# Patient Record
Sex: MALE | Race: WHITE | NOT HISPANIC OR LATINO | Employment: OTHER | URBAN - METROPOLITAN AREA
[De-identification: names, ages, dates, MRNs, and addresses within clinical notes are randomized per-mention and may not be internally consistent; named-entity substitution may affect disease eponyms.]

---

## 2020-09-09 ENCOUNTER — OFFICE VISIT (OUTPATIENT)
Dept: NEUROLOGY | Facility: CLINIC | Age: 65
End: 2020-09-09
Payer: MEDICARE

## 2020-09-09 VITALS
BODY MASS INDEX: 22.19 KG/M2 | TEMPERATURE: 97.7 F | HEIGHT: 70 IN | WEIGHT: 155 LBS | DIASTOLIC BLOOD PRESSURE: 66 MMHG | RESPIRATION RATE: 16 BRPM | SYSTOLIC BLOOD PRESSURE: 130 MMHG | HEART RATE: 84 BPM

## 2020-09-09 DIAGNOSIS — K71.6 DRUG-INDUCED HEPATIC TOXICITY: ICD-10-CM

## 2020-09-09 DIAGNOSIS — T50.905A DRUG-INDUCED HEPATIC TOXICITY: ICD-10-CM

## 2020-09-09 DIAGNOSIS — G35 MULTIPLE SCLEROSIS (CMS/HCC): Primary | ICD-10-CM

## 2020-09-09 DIAGNOSIS — T50.905A ADVERSE EFFECT OF DRUG, INITIAL ENCOUNTER: ICD-10-CM

## 2020-09-09 PROBLEM — B18.1 CHRONIC VIRAL HEPATITIS B WITHOUT DELTA AGENT AND WITHOUT COMA (CMS/HCC): Status: ACTIVE | Noted: 2020-09-09

## 2020-09-09 PROCEDURE — 99205 OFFICE O/P NEW HI 60 MIN: CPT | Performed by: PSYCHIATRY & NEUROLOGY

## 2020-09-09 RX ORDER — CHOLECALCIFEROL (VITAMIN D3) 25 MCG
2000 TABLET ORAL DAILY
COMMUNITY

## 2020-09-09 RX ORDER — ATORVASTATIN CALCIUM 20 MG/1
20 TABLET, FILM COATED ORAL 3 TIMES WEEKLY
COMMUNITY
End: 2020-10-09

## 2020-09-09 RX ORDER — CLONAZEPAM 0.5 MG/1
0.5 TABLET ORAL DAILY
COMMUNITY
End: 2020-10-06 | Stop reason: SDUPTHER

## 2020-09-09 RX ORDER — METOPROLOL TARTRATE 25 MG/1
12.5 TABLET, FILM COATED ORAL 2 TIMES DAILY
COMMUNITY
Start: 2020-09-04 | End: 2024-09-12 | Stop reason: ALTCHOICE

## 2020-09-09 RX ORDER — ASPIRIN 81 MG/1
81 TABLET ORAL DAILY
COMMUNITY

## 2020-09-09 RX ORDER — ESCITALOPRAM OXALATE 10 MG/1
10 TABLET ORAL DAILY
COMMUNITY
End: 2020-09-09

## 2020-09-09 RX ORDER — DIMETHICONE 13 MG/ML
425 LOTION TOPICAL DAILY
COMMUNITY

## 2020-09-09 RX ORDER — BACLOFEN 10 MG/1
10 TABLET ORAL 2 TIMES DAILY
COMMUNITY
End: 2021-07-06

## 2020-09-09 RX ORDER — GLATIRAMER 40 MG/ML
40 INJECTION, SOLUTION SUBCUTANEOUS 3 TIMES WEEKLY
COMMUNITY
End: 2020-10-09

## 2020-09-09 RX ORDER — ROSUVASTATIN CALCIUM 10 MG/1
10 TABLET, COATED ORAL 3 TIMES WEEKLY
COMMUNITY

## 2020-09-09 SDOH — HEALTH STABILITY: MENTAL HEALTH: HOW MANY DRINKS CONTAINING ALCOHOL DO YOU HAVE ON A TYPICAL DAY WHEN YOU ARE DRINKING?: 1 OR 2

## 2020-09-09 SDOH — HEALTH STABILITY: MENTAL HEALTH: HOW OFTEN DO YOU HAVE A DRINK CONTAINING ALCOHOL?: 2-3 TIMES A WEEK

## 2020-09-09 NOTE — ASSESSMENT & PLAN NOTE
The patient has a history of a relapsing form of multiple sclerosis dating for 38 years.  He says he gets a relapse every 1 or 2 years.  Since he has been Medicare age he has been using leftover Copaxone usually 40 mg twice weekly.  He is interested in affordable alternatives.  He has specific interest in Ocrevus.  He will get baseline blood work and updated MRI scans of the brain and cervical spine.  I will see him in 6 weeks.

## 2020-09-09 NOTE — PROGRESS NOTES
Neurology Consult Note    Subjective     Virgil Edge is a 65 y.o. male evaluated regarding multiple sclerosis.      The patient says he initially presented with binocular visual impairment 38 years ago.  He improved with oral steroids.  He went on to have other relapses.  He followed with neurologists Dr. Aubrey Bennett at [facility] and subsequently Dr. Alberto Guallpa locally.  The patient seeks to establish a new neurology relationship.  He indicates that he took Copaxone for many years but once he had Medicare age he reduced his compliance to about twice weekly.  He indicates his last relapse was about a year ago.  He says he may get a relapse every 1 to 2 years.  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  He denies difficulty with vision.  He denies difficulty with hand function.  He drives with his right foot on the accelerator and left foot on the brake.  He has had a right AFO brace for about 15 years; he got a new one last year.  He has been ambulatory with a walker for about 10 years.  He has rare falls.  His wife is a nurse.  He had an MRI brain scan 9/7/2018 which reported white matter lesions consistent with multiple sclerosis but no enhancement.  MRI cervical spine then showed a left posterior lateral cord high signal at C3-4 and a questionable area lateral at C5.  He has been on Social Security since 2010.  He lives in a townhouse with his wife and has a stair chair x8 months.  He falls rarely.    Medical History:   Past Medical History:   Diagnosis Date   • BPH (benign prostatic hyperplasia)    • CAD (coronary artery disease)        Surgical History:   Past Surgical History:   Procedure Laterality Date   • CORONARY ANGIOPLASTY WITH STENT PLACEMENT  05/2016    x 3   • TRANSURETHRAL RESECTION OF PROSTATE  2013       Allergies: No known drug allergies    Current Outpatient Medications   Medication Sig Dispense Refill   • aspirin 81 mg enteric coated tablet Take 81 mg by mouth daily.      • atorvastatin (LIPITOR) 20 mg tablet Take 20 mg by mouth daily.     • baclofen (LIORESAL) 10 mg tablet Take 10 mg by mouth 2 (two) times a day.     • cholecalciferol, vitamin D3, 1,000 unit (25 mcg) tablet Take 2,000 Units by mouth daily.     • clonazePAM (klonoPIN) 0.5 mg tablet Take 0.5 mg by mouth daily.     • cranberry extract 425 mg capsule Take 425 mg by mouth daily.     • glatiramer (COPAXONE) 40 mg/mL syringe Inject 40 mg under the skin 3 (three) times a week (Mon, Wed, Fri).     • rosuvastatin (CRESTOR) 10 mg tablet Take 10 mg by mouth 3 (three) times a week (Mon, Wed, Fri).     • metoprolol tartrate (LOPRESSOR) 25 mg tablet Take 12.5 mg by mouth 2 (two) times a day.       No current facility-administered medications for this visit.          Family History: History reviewed. No pertinent family history.    Social History:   Social History     Socioeconomic History   • Marital status:      Spouse name: None   • Number of children: 2   • Years of education: None   • Highest education level: None   Occupational History   • Occupation: Retired   Social Needs   • Financial resource strain: None   • Food insecurity:     Worry: None     Inability: None   • Transportation needs:     Medical: None     Non-medical: None   Tobacco Use   • Smoking status: Never Smoker   • Smokeless tobacco: Never Used   Substance and Sexual Activity   • Alcohol use: Yes     Frequency: 2-3 times a week     Drinks per session: 1 or 2   • Drug use: None   • Sexual activity: None   Lifestyle   • Physical activity:     Days per week: None     Minutes per session: None   • Stress: None   Relationships   • Social connections:     Talks on phone: None     Gets together: None     Attends Jainism service: None     Active member of club or organization: None     Attends meetings of clubs or organizations: None     Relationship status: None   • Intimate partner violence:     Fear of current or ex partner: None     Emotionally abused:  "None     Physically abused: None     Forced sexual activity: None   Other Topics Concern   • None   Social History Narrative   • None       Review of Systems  Constitutional: negative  Eyes: positive for Pupillary trauma as a child  Ears, nose, mouth, throat, and face: negative  Respiratory: negative  Cardiovascular: positive for CAD  Gastrointestinal: negative  Genitourinary:positive for Urinary retention  Integument/breast: negative  Hematologic/lymphatic: negative  Musculoskeletal:positive for Weakness  Neurological: negative  Behavioral/Psych: negative  Endocrine: negative  Allergic/Immunologic: negative        Objective     Physical Exam  Visit Vitals  /66   Pulse 84   Temp 36.5 °C (97.7 °F)   Resp 16   Ht 1.778 m (5' 10\")   Wt 70.3 kg (155 lb)   BMI 22.24 kg/m²       General Appearance:  Alert, no distress, appears stated age   Head:  Normocephalic, atraumatic   Eyes:   Left pupil 4 mm with right pupil 2 mm,  EOM's intact                 Neck: Supple,  no carotid bruit         Lungs:  Clear to auscultation bilaterally, respirations unlabored       Heart Regular rate and rhythm, S1 and S2 normal, no murmur       Extremities:  no clubbing, cyanosis or edema    Musculoskeletal:  Right carbon fiber AFO.         Skin: Skin color, texture, turgor normal; no rashes or lesions   Behavior/Emotional: Appropriate, cooperative   Neurologic Exam:  Higher cortical function:  Alert and conversant.  Oriented x 3. Language intact.  Attention, concentration, memory mildly impaired.       Cranial nerve exam:  Pupils asymmetric as above. Extraocular movement full with normal pursuit and saccades. No nystagmus.  Facial movement and sensation is normal. Hearing intact.   Speech clear.     Motor exam: Mild bilateral hip flexor weakness, worse on the right.  Mild right dorsiflexor weakness.  Good  strength elsewhere.  Mild slowing of  rapid movements hands and more moderate feet, right greater than left.  Bilateral pronator " drift.There was decreased  bulk in the right lower leg.  Tone somewhat spastic in the legs.  No abnormal movements.     Reflexes : 1+ and symmetric with bilateral Babinski.     Sensory examination: Decreased  vibration toes.  Intact  graphaesthesia.    Cerebellar exam: Mild dysmetria.     Gait: rolling walker, right AFO brace, mildly spastic.        Assessment and Plan    Multiple sclerosis (CMS/[facility])  The patient has a history of a relapsing form of multiple sclerosis dating for 38 years.  He says he gets a relapse every 1 or 2 years.  Since he has been Medicare age he has been using leftover Copaxone usually 40 mg twice weekly.  He is interested in affordable alternatives.  He has specific interest in Ocrevus.  He will get baseline blood work and updated MRI scans of the brain and cervical spine.  I will see him in 6 weeks.    Adverse effect of drug/medicinal  Obtain surveillance blood work.      Arun Meadows MD  5:23 PM

## 2020-09-14 ENCOUNTER — TELEPHONE (OUTPATIENT)
Dept: NEUROLOGY | Facility: CLINIC | Age: 65
End: 2020-09-14

## 2020-09-14 DIAGNOSIS — T50.905D ADVERSE EFFECT OF DRUG, SUBSEQUENT ENCOUNTER: Primary | ICD-10-CM

## 2020-09-14 NOTE — TELEPHONE ENCOUNTER
I was able to order hepatitis studies with help.  Please send to patient.  I called and left message

## 2020-10-06 LAB
ALBUMIN SERPL-MCNC: 4.5 G/DL (ref 3.8–4.8)
ALBUMIN/GLOB SERPL: 2.4 {RATIO} (ref 1.2–2.2)
ALP SERPL-CCNC: 59 IU/L (ref 39–117)
ALT SERPL-CCNC: 32 IU/L (ref 0–44)
AST SERPL-CCNC: 24 IU/L (ref 0–40)
BASOPHILS # BLD AUTO: 0 X10E3/UL (ref 0–0.2)
BASOPHILS NFR BLD AUTO: 1 %
BILIRUB SERPL-MCNC: 0.7 MG/DL (ref 0–1.2)
BUN SERPL-MCNC: 13 MG/DL (ref 8–27)
BUN/CREAT SERPL: 15 (ref 10–24)
CALCIUM SERPL-MCNC: 9.2 MG/DL (ref 8.6–10.2)
CHLORIDE SERPL-SCNC: 105 MMOL/L (ref 96–106)
CO2 SERPL-SCNC: 29 MMOL/L (ref 20–29)
CREAT SERPL-MCNC: 0.87 MG/DL (ref 0.76–1.27)
EOSINOPHIL # BLD AUTO: 0.1 X10E3/UL (ref 0–0.4)
EOSINOPHIL NFR BLD AUTO: 1 %
ERYTHROCYTE [DISTWIDTH] IN BLOOD BY AUTOMATED COUNT: 12.1 % (ref 11.6–15.4)
GLOBULIN SER CALC-MCNC: 1.9 G/DL (ref 1.5–4.5)
GLUCOSE SERPL-MCNC: 103 MG/DL (ref 65–99)
HCT VFR BLD AUTO: 46.1 % (ref 37.5–51)
HGB BLD-MCNC: 15.4 G/DL (ref 13–17.7)
IMM GRANULOCYTES # BLD AUTO: 0 X10E3/UL (ref 0–0.1)
IMM GRANULOCYTES NFR BLD AUTO: 0 %
LAB CORP EGFR IF AFRICN AM: 103 ML/MIN/1.73
LAB CORP EGFR IF NONAFRICN AM: 89 ML/MIN/1.73
LYMPHOCYTES # BLD AUTO: 1.7 X10E3/UL (ref 0.7–3.1)
LYMPHOCYTES NFR BLD AUTO: 24 %
MCH RBC QN AUTO: 30.9 PG (ref 26.6–33)
MCHC RBC AUTO-ENTMCNC: 33.4 G/DL (ref 31.5–35.7)
MCV RBC AUTO: 92 FL (ref 79–97)
MONOCYTES # BLD AUTO: 0.5 X10E3/UL (ref 0.1–0.9)
MONOCYTES NFR BLD AUTO: 7 %
NEUTROPHILS # BLD AUTO: 4.8 X10E3/UL (ref 1.4–7)
NEUTROPHILS NFR BLD AUTO: 67 %
PLATELET # BLD AUTO: 176 X10E3/UL (ref 150–450)
POTASSIUM SERPL-SCNC: 4 MMOL/L (ref 3.5–5.2)
PROT SERPL-MCNC: 6.4 G/DL (ref 6–8.5)
RBC # BLD AUTO: 4.99 X10E6/UL (ref 4.14–5.8)
SODIUM SERPL-SCNC: 145 MMOL/L (ref 134–144)
SPECIMEN STATUS: NORMAL
WBC # BLD AUTO: 7.2 X10E3/UL (ref 3.4–10.8)

## 2020-10-06 RX ORDER — CLONAZEPAM 0.5 MG/1
0.5 TABLET ORAL DAILY
Qty: 30 TABLET | Refills: 5 | Status: SHIPPED | OUTPATIENT
Start: 2020-10-06 | End: 2021-04-22 | Stop reason: SDUPTHER

## 2020-10-13 ENCOUNTER — HOSPITAL ENCOUNTER (OUTPATIENT)
Dept: RADIOLOGY | Age: 65
Discharge: HOME | End: 2020-10-13
Attending: PSYCHIATRY & NEUROLOGY
Payer: MEDICARE

## 2020-10-13 DIAGNOSIS — G35 MULTIPLE SCLEROSIS (CMS/HCC): ICD-10-CM

## 2020-10-13 RX ORDER — GADOBUTROL 604.72 MG/ML
7 INJECTION INTRAVENOUS ONCE
Status: COMPLETED | OUTPATIENT
Start: 2020-10-13 | End: 2020-10-13

## 2020-10-13 RX ADMIN — GADOBUTROL 7 MMOL: 604.72 INJECTION INTRAVENOUS at 12:00

## 2020-10-13 NOTE — PROGRESS NOTES
Neurology Progress Note    Subjective     Virgil Edge is a 65 y.o. male evaluated regarding  multiple sclerosis.       I met the patient for initial evaluation 9/9/2020.  He initially presented with binocular visual impairment 38 years ago.  He improved with oral steroids.  He went on to have other relapses.  He followed with neurologists Dr. Aubrey Bennett at [facility] and subsequently Dr. Alberto Guallpa locally.   He took Copaxone for many years but once he had Medicare he reduced his compliance to about twice weekly.  He indicated his last relapse was about a year ago.  He said he may get a relapse every 1 to 2 years.  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  He denied difficulty with vision or hand function.  He drove with his right foot on the accelerator and left foot on the brake.  He  had a right AFO brace for about 15 years; he got a new one last year.  He had been ambulatory with a walker for about 10 years.  He had rare falls.  His wife is a nurse.  He had an MRI brain scan 9/7/2018 which reported white matter lesions consistent with multiple sclerosis but no enhancement.  MRI cervical spine then showed a left posterior lateral cord high signal at C3-4 and a questionable area lateral at C5.  He had been on Social Security since 2010.  He lived in a townhouse with his wife and had a stair chair x8 months.      The patient underwent MRI brain 10/13/2020 which reported scattered foci of T2 prolongation in the periventricular and deep white matter also involving the corpus callosum.  There were no enhancing abnormalities.  MRI cervical spine showed foci of abnormal signal throughout the cord most notably at C2-3 on the left, C3-4 on the left, C5-6 bilaterally, C7 on the left; there was no pathologic enhancement.  I reviewed images today with the patient and his wife Ashly (who is a nurse).  Laboratory studies 10/5/2020 showed benign chemistries and CBC.  Hepatitis B studies were not  obtained yet.  The wife feels that his ambulation has declined in the last 1 year.  He had physical therapy for 2 months this summer through [facility].    Medical History:   Past Medical History:   Diagnosis Date   • BPH (benign prostatic hyperplasia)    • CAD (coronary artery disease)        Surgical History:   Past Surgical History:   Procedure Laterality Date   • CORONARY ANGIOPLASTY WITH STENT PLACEMENT  05/2016    x 3   • TRANSURETHRAL RESECTION OF PROSTATE  2013       Allergies: No known drug allergies    Current Outpatient Medications   Medication Sig Dispense Refill   • aspirin 81 mg enteric coated tablet Take 81 mg by mouth daily.     • baclofen (LIORESAL) 10 mg tablet Take 10 mg by mouth 2 (two) times a day.     • cholecalciferol, vitamin D3, 1,000 unit (25 mcg) tablet Take 2,000 Units by mouth daily.     • clonazePAM (klonoPIN) 0.5 mg tablet Take 1 tablet (0.5 mg total) by mouth daily. 30 tablet 5   • cranberry extract 425 mg capsule Take 425 mg by mouth daily.     • metoprolol tartrate (LOPRESSOR) 25 mg tablet Take 12.5 mg by mouth 2 (two) times a day.     • rosuvastatin (CRESTOR) 10 mg tablet Take 10 mg by mouth 3 (three) times a week (Mon, Wed, Fri).       No current facility-administered medications for this visit.          Family History: History reviewed. No pertinent family history.    Social History:   Social History     Socioeconomic History   • Marital status:      Spouse name: None   • Number of children: 2   • Years of education: None   • Highest education level: None   Occupational History   • Occupation: Retired   Social Needs   • Financial resource strain: None   • Food insecurity     Worry: None     Inability: None   • Transportation needs     Medical: None     Non-medical: None   Tobacco Use   • Smoking status: Never Smoker   • Smokeless tobacco: Never Used   Substance and Sexual Activity   • Alcohol use: Yes     Frequency: 2-3 times a week     Drinks per session: 1 or 2   •  Drug use: None   • Sexual activity: None   Lifestyle   • Physical activity     Days per week: None     Minutes per session: None   • Stress: None   Relationships   • Social connections     Talks on phone: None     Gets together: None     Attends Yazidi service: None     Active member of club or organization: None     Attends meetings of clubs or organizations: None     Relationship status: None   • Intimate partner violence     Fear of current or ex partner: None     Emotionally abused: None     Physically abused: None     Forced sexual activity: None   Other Topics Concern   • None   Social History Narrative   • None           Objective     Physical Exam  Visit Vitals  BP (!) 134/56   Pulse 78   Temp 36.6 °C (97.9 °F)   Resp 16     Higher cortical function: Cognitively clear, language intact.  Cranial nerves: Eye signs as below otherwise.cranial nerves II through XII normal.  Motor: Normal strength and rapid movements throughout.  No pronator drift.  Deep tendon reflexes: 1+ and symmetric with absent right ankle, depressed the left ankle, and bilateral Babinski.  Sensory exam: Decreased vibration toes.    Cerebellum: Mild bilateral upper extremity dysmetria.  Gait: Mildly spastic and scraped his right toe, rolling walker, right AFO brace.    General: Awake, alert, in no apparent distress.  HEENT: Normocephalic atraumatic.  Eyes: Orbits benign,  extraocular motions full.  Left pupil larger than right.  Bilateral VINCENT.  Neck : Supple, no carotid bruit.    Lungs: Clear bilaterally.  Heart: S1 and S2 normal, regular rate and rhythm, no murmur.  Extremities: No clubbing, cyanosis, or edema.  Musculoskeletal: Right carbon fiber AFO brace.  .  Psychiatric: Appropriate and cooperative    Assessment and Plan    Multiple sclerosis (CMS/[facility])  The patient has a history of multiple sclerosis dating for 38 years.  He has had previous relapses.  He is using a leftover supply of Copaxone now that he is on Medicare and rather  than using this 40 mg 3 times weekly he is using it just twice weekly.  His updated MRI scans of the brain and cervical spinal cord show old but not new or enhancing lesions.  We discussed alternative disease modifying therapies.  He would need to get hepatitis B studies in order to pursue Ocrevus.  We discussed oral therapies as well.  His wife is a nurse and will look into options under his insurance and they might consider altering their insurance plan for the coming year 2021.  I will see him in follow-up in 2 months.      Arun Meadows MD  12:20 PM

## 2020-10-14 ENCOUNTER — OFFICE VISIT (OUTPATIENT)
Dept: NEUROLOGY | Facility: CLINIC | Age: 65
End: 2020-10-14
Payer: MEDICARE

## 2020-10-14 VITALS
RESPIRATION RATE: 16 BRPM | HEART RATE: 78 BPM | DIASTOLIC BLOOD PRESSURE: 56 MMHG | SYSTOLIC BLOOD PRESSURE: 134 MMHG | TEMPERATURE: 97.9 F

## 2020-10-14 DIAGNOSIS — G35 MULTIPLE SCLEROSIS (CMS/HCC): Primary | ICD-10-CM

## 2020-10-14 PROCEDURE — 99214 OFFICE O/P EST MOD 30 MIN: CPT | Performed by: PSYCHIATRY & NEUROLOGY

## 2020-10-14 NOTE — ASSESSMENT & PLAN NOTE
The patient has a history of multiple sclerosis dating for 38 years.  He has had previous relapses.  He is using a leftover supply of Copaxone now that he is on Medicare and rather than using this 40 mg 3 times weekly he is using it just twice weekly.  His updated MRI scans of the brain and cervical spinal cord show old but not new or enhancing lesions.  We discussed alternative disease modifying therapies.  He would need to get hepatitis B studies in order to pursue Ocrevus.  We discussed oral therapies as well.  His wife is a nurse and will look into options under his insurance and they might consider altering their insurance plan for the coming year 2021.  I will see him in follow-up in 2 months.

## 2020-11-02 ENCOUNTER — TELEPHONE (OUTPATIENT)
Dept: NEUROLOGY | Facility: CLINIC | Age: 65
End: 2020-11-02

## 2020-11-02 DIAGNOSIS — B19.10 HEPATITIS B INFECTION WITHOUT DELTA AGENT WITHOUT HEPATIC COMA, UNSPECIFIED CHRONICITY: Primary | ICD-10-CM

## 2020-11-24 LAB
HBV CORE AB SERPL QL IA: NEGATIVE
HBV SURFACE AG SERPL QL IA: NEGATIVE

## 2020-12-14 ENCOUNTER — TELEPHONE (OUTPATIENT)
Dept: NEUROLOGY | Facility: CLINIC | Age: 65
End: 2020-12-14

## 2020-12-14 DIAGNOSIS — G35 MULTIPLE SCLEROSIS (CMS/HCC): Primary | ICD-10-CM

## 2020-12-14 RX ORDER — PREDNISONE 20 MG/1
TABLET ORAL
Qty: 45 TABLET | Refills: 0 | Status: SHIPPED | OUTPATIENT
Start: 2020-12-14 | End: 2021-07-06

## 2020-12-14 NOTE — TELEPHONE ENCOUNTER
Phoned and discussed with the wife who is a nurse.  She indicates that Bennie has had enhanced weakness in the lower extremities and some pins-and-needles in his lower back which began 12/12/2020.  He has had this a number of times in the past and Dr. Guallpa would typically have him take prednisone 60 mg daily x4 days, 50 mg daily x4 days, etc.  The wife said they had some leftover prednisone that began this on 12/12.  I sent prescription for prednisone 20 mg (tablets are splittable).  I am due to see him in follow-up in 3 days, weather permitting.  Rx sent

## 2020-12-17 ENCOUNTER — TELEMEDICINE (OUTPATIENT)
Dept: NEUROLOGY | Facility: CLINIC | Age: 65
End: 2020-12-17
Payer: MEDICARE

## 2020-12-17 VITALS
BODY MASS INDEX: 20.3 KG/M2 | DIASTOLIC BLOOD PRESSURE: 70 MMHG | HEIGHT: 71 IN | WEIGHT: 145 LBS | SYSTOLIC BLOOD PRESSURE: 130 MMHG

## 2020-12-17 DIAGNOSIS — G35 MULTIPLE SCLEROSIS (CMS/HCC): Primary | ICD-10-CM

## 2020-12-17 PROCEDURE — 99213 OFFICE O/P EST LOW 20 MIN: CPT | Mod: 95 | Performed by: PSYCHIATRY & NEUROLOGY

## 2020-12-17 NOTE — PROGRESS NOTES
Verification of Patient Location:  The patient affirms they are currently located in the following state: [place]    Request for Consent:    Video Encounter   Jenny, my name is Arun Meadows MD.  Before we proceed, can you please verify your identification by telling me your full name and date of birth?  Can you tell me who is in the room with you?    You and I are about to have a telemedicine check-in or visit because you have requested it.  This is a live video-conference.  I am a real person, speaking to you in real time.  There is no one else with me on the video-conference.  However, when we use ([employer], [employer], etc) it is important for you to know that the video-conference may not be secure or private.  I am not recording this conversation and I am asking you not to record it.  This telemedicine visit will be billed to your health insurance or you, if you are self-insured.  You understand you will be responsible for any copayments or coinsurances that apply to your telemedicine visit.  Communication platform used for this encounter:  [employer]net     Before starting our telemedicine visit, I am required to get your consent for this virtual check-in or visit by telemedicine. Do you consent?      Patient Response to Request for Consent:  Yes      Visit Documentation:  Subjective     Patient ID: Virgil Edge is a 65 y.o. male.  1955      [facility]    Neurology Progress Note    Subjective     Virgil Edge is a 65 y.o. male evaluated regarding  multiple sclerosis.       I met the patient for initial evaluation 9/9/2020.  He initially presented with binocular visual impairment 38 years ago.  He improved with oral steroids.  He went on to have other relapses.  He followed with neurologists Dr. Aubrey Bennett at [facility] and subsequently Dr. Alberto Guallpa locally.   He took Copaxone for many years but once he had Medicare he reduced his compliance to about twice weekly.  He indicated his last relapse was  about a year ago.  He said he may get a relapse every 1 to 2 years.  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  He denied difficulty with vision or hand function.  He drove with his right foot on the accelerator and left foot on the brake.  He  had a right AFO brace for about 15 years; he got a new one last year.  He had been ambulatory with a walker for about 10 years.  He had rare falls.  His wife is a nurse.  He had an MRI brain scan 9/7/2018 which reported white matter lesions consistent with multiple sclerosis but no enhancement.  MRI cervical spine then showed a left posterior lateral cord high signal at C3-4 and a questionable area lateral at C5.  He had been on Social Security since 2010.  He lived in a townhouse with his wife and had a stair chair x8 months.       The patient returned 10/14/2020. MRI brain 10/13/2020 reported scattered foci of T2 prolongation in the periventricular and deep white matter also involving the corpus callosum.  There were no enhancing abnormalities.  MRI cervical spine showed foci of abnormal signal throughout the cord most notably at C2-3 on the left, C3-4 on the left, C5-6 bilaterally, C7 on the left; there was no pathologic enhancement.  I reviewed images  with the patient and his wife Ashly (who is a nurse).  Laboratory studies 10/5/2020 showed benign chemistries and CBC.  Hepatitis B studies were not obtained yet.  The wife felt that his ambulation had declined in the last 1 year.  He had physical therapy for 2 months this summer through [facility]. alternative disease modifying therapies were discussed including oral therapies and Ocrevus.    The wife phoned 12/14/2020 indicating that the patient had enhanced weakness in the lower extremities with some pins-and-needles in the lower back beginning 2 days prior.  This had occurred in the past and the patient would go on to an oral prednisone taper; I sent in a prescription in this regard.    At this time  the patient says he bounced back pretty quickly after beginning prednisone 60 mg daily x4 days.  Says that 3 weeks ago he had a UTI that cleared with Cipro twice daily x10 days.  It was after the UTI cleared that he had difficulty with his legs.  He did have a repeat urine culture.  I reviewed his laboratories of 11/23/2020 showing negative hepatitis B studies.  He has 2 months left of his Copaxone and is interested in transitioning to [facility].    Medical History:   Past Medical History:   Diagnosis Date   • BPH (benign prostatic hyperplasia)    • CAD (coronary artery disease)        Surgical History:   Past Surgical History:   Procedure Laterality Date   • CORONARY ANGIOPLASTY WITH STENT PLACEMENT  05/2016    x 3   • TRANSURETHRAL RESECTION OF PROSTATE  2013       Allergies: No known drug allergies    Current Outpatient Medications   Medication Sig Dispense Refill   • aspirin 81 mg enteric coated tablet Take 81 mg by mouth daily.     • baclofen (LIORESAL) 10 mg tablet Take 10 mg by mouth 2 (two) times a day.     • cholecalciferol, vitamin D3, 1,000 unit (25 mcg) tablet Take 2,000 Units by mouth daily.     • clonazePAM (klonoPIN) 0.5 mg tablet Take 1 tablet (0.5 mg total) by mouth daily. 30 tablet 5   • cranberry extract 425 mg capsule Take 425 mg by mouth daily.     • metoprolol tartrate (LOPRESSOR) 25 mg tablet Take 12.5 mg by mouth 2 (two) times a day.     • predniSONE (DELTASONE) 20 mg tablet 1 tablet 3 times daily x4 days then as directed.  Take with food 45 tablet 0   • rosuvastatin (CRESTOR) 10 mg tablet Take 10 mg by mouth 3 (three) times a week (Mon, Wed, Fri).       No current facility-administered medications for this visit.          Family History: History reviewed. No pertinent family history.    Social History:   Social History     Socioeconomic History   • Marital status:      Spouse name: None   • Number of children: 2   • Years of education: None   • Highest education level: None  "  Occupational History   • Occupation: Retired   Social Needs   • Financial resource strain: None   • Food insecurity     Worry: None     Inability: None   • Transportation needs     Medical: None     Non-medical: None   Tobacco Use   • Smoking status: Never Smoker   • Smokeless tobacco: Never Used   Substance and Sexual Activity   • Alcohol use: Yes     Frequency: 2-3 times a week     Drinks per session: 1 or 2   • Drug use: None   • Sexual activity: None   Lifestyle   • Physical activity     Days per week: None     Minutes per session: None   • Stress: None   Relationships   • Social connections     Talks on phone: None     Gets together: None     Attends Yazidi service: None     Active member of club or organization: None     Attends meetings of clubs or organizations: None     Relationship status: None   • Intimate partner violence     Fear of current or ex partner: None     Emotionally abused: None     Physically abused: None     Forced sexual activity: None   Other Topics Concern   • None   Social History Narrative   • None         Objective     Physical Exam  Visit Vitals  /70   Ht 1.803 m (5' 11\")   Wt 65.8 kg (145 lb)   BMI 20.22 kg/m²     Higher cortical function: Cognitively clear, language intact.  Cranial nerves: History of bilateral VINCENT and larger left pupil.  Otherwise cranial nerves II through XII normal.  Motor: Grossly intact upper extremity functions.    Cerebellum: No upper extremity dysmetria.  General: Awake, alert, in no apparent distress.  HEENT: Normocephalic atraumatic.  Eyes: Orbits benign,  extraocular motions full.  Neck : Supple.    Lungs: Comfortable respiratory status.    Psychiatric: Appropriate and cooperative    Assessment and Plan    Multiple sclerosis (CMS/[facility])  The patient had a recent decline in lower extremity function and responded quickly to prednisone 60 mg daily x4 days.  He may taper as 50 mg daily x2 days, 40 mg daily x2 days, etc..  He would like to finish out " his supply of Copaxone for 2 months and then pursue Ocrevus.  Our office will file paperwork on his behalf in this regard.  I will reassess him in March.      Arun Meadows MD  4:53 PM    Assessment/Plan   Diagnoses and all orders for this visit:    Multiple sclerosis (CMS/[facility]) (Primary)  Assessment & Plan:  The patient had a recent decline in lower extremity function and responded quickly to prednisone 60 mg daily x4 days.  He may taper as 50 mg daily x2 days, 40 mg daily x2 days, etc..  He would like to finish out his supply of Copaxone for 2 months and then pursue Ocrevus.  Our office will file paperwork on his behalf in this regard.  I will reassess him in March.        Time Spent in Medical Discussion During This Encounter:     19 minutes

## 2020-12-17 NOTE — ASSESSMENT & PLAN NOTE
The patient had a recent decline in lower extremity function and responded quickly to prednisone 60 mg daily x4 days.  He may taper as 50 mg daily x2 days, 40 mg daily x2 days, etc..  He would like to finish out his supply of Copaxone for 2 months and then pursue Ocrevus.  Our office will file paperwork on his behalf in this regard.  I will reassess him in March.

## 2021-04-14 DIAGNOSIS — Z23 ENCOUNTER FOR IMMUNIZATION: ICD-10-CM

## 2021-04-22 DIAGNOSIS — G35 MULTIPLE SCLEROSIS (CMS/HCC): Primary | ICD-10-CM

## 2021-04-22 RX ORDER — CLONAZEPAM 0.5 MG/1
0.5 TABLET ORAL DAILY
Qty: 30 TABLET | Refills: 5 | Status: SHIPPED | OUTPATIENT
Start: 2021-04-22 | End: 2021-10-29

## 2021-04-22 NOTE — TELEPHONE ENCOUNTER
I saw him 12/17/2020 at which time he said he was going to finish 2 months of leftover Copaxone and then consider pursuing Ocrevus.  I wanted to see him after his infusion so please set up a follow-up appointment.

## 2021-07-05 NOTE — PROGRESS NOTES
Neurology Progress Note    Subjective     Virgil Edge is a 65 y.o. male evaluated regarding multiple sclerosis.       I met the patient for initial evaluation 9/9/2020.  He initially presented with binocular visual impairment 38 years ago.  He improved with oral steroids.  He went on to have other relapses.  He followed with neurologists Dr. Aubrey Bennett at [facility] and subsequently Dr. Alberto Guallpa locally.   He took Copaxone for many years but once he had Medicare he reduced his compliance to about twice weekly.  He indicated his last relapse was about a year ago.  He said he may get a relapse every 1 to 2 years.  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  He denied difficulty with vision or hand function.  He drove with his right foot on the accelerator and left foot on the brake.  He  had a right AFO brace for about 15 years; he got a new one last year.  He had been ambulatory with a walker for about 10 years.  He had rare falls.  His wife is a nurse.  He had an MRI brain scan 9/7/2018 which reported white matter lesions consistent with multiple sclerosis but no enhancement.  MRI cervical spine then showed a left posterior lateral cord high signal at C3-4 and a questionable area lateral at C5.  He had been on Social Security since 2010.  He lived in a townhouse with his wife and had a stair chair x8 months.       The patient returned 10/14/2020. MRI brain 10/13/2020 reported scattered foci of T2 prolongation in the periventricular and deep white matter also involving the corpus callosum.  There were no enhancing abnormalities.  MRI cervical spine showed foci of abnormal signal throughout the cord most notably at C2-3 on the left, C3-4 on the left, C5-6 bilaterally, C7 on the left; there was no pathologic enhancement.  I reviewed images  with the patient and his wife Ashly (who is a nurse).  Laboratory studies 10/5/2020 showed benign chemistries and CBC.  Hepatitis B studies were not  obtained yet.  The wife felt that his ambulation had declined in the last 1 year.  He had physical therapy for 2 months this summer through [facility]. Alternative disease modifying therapies were discussed including oral therapies and Ocrevus.     The wife phoned 12/14/2020 indicating that the patient had enhanced weakness in the lower extremities with some pins-and-needles in the lower back beginning 2 days prior.  This had occurred in the past and the patient would go on to an oral prednisone taper; I sent in a prescription in this regard.     He was seen via telemedicine 12/17/2020.  He said  he bounced back pretty quickly after beginning prednisone 60 mg daily x4 days.    He said that 3 weeks ago he had a UTI that cleared with Cipro twice daily x10 days.  It was after the UTI cleared that he had difficulty with his legs.  He did have a repeat urine culture.  I reviewed his laboratories of 11/23/2020 showing negative hepatitis B studies.  He had 2 months left of his Copaxone and was interested in transitioning to Ocrevus.    He had his first Ocrevus infusions 3/2/2021 and 3/17/2021.  He indicates he tolerated these well.  He did have his Materna COVID-19 vaccinations 2/8/2021 and 3/8/2021.  He drove to the office today in his [employer]; he uses both feet.  He puts his walker in the back seat.  He was given a wheelchair and escorted back to our office.  When he gets home there are 5 steps into the front door of their townhouse and he has a railing.  He uses a cane to get in the front door and in the house.  He admits to some difficulty with memory.    Medical History:   Past Medical History:   Diagnosis Date   • BPH (benign prostatic hyperplasia)    • CAD (coronary artery disease)        Surgical History:   Past Surgical History:   Procedure Laterality Date   • CORONARY ANGIOPLASTY WITH STENT PLACEMENT  05/2016    x 3   • TRANSURETHRAL RESECTION OF PROSTATE  2013       Allergies: No known drug  allergies    Current Outpatient Medications   Medication Sig Dispense Refill   • aspirin 81 mg enteric coated tablet Take 81 mg by mouth daily.     • cholecalciferol, vitamin D3, 1,000 unit (25 mcg) tablet Take 2,000 Units by mouth daily.     • clonazePAM (klonoPIN) 0.5 mg tablet Take 1 tablet (0.5 mg total) by mouth daily. 30 tablet 5   • cranberry extract 425 mg capsule Take 425 mg by mouth daily.     • metoprolol tartrate (LOPRESSOR) 25 mg tablet Take 12.5 mg by mouth 2 (two) times a day.     • ocrelizumab (OCREVUS) 30 mg/mL solution Infuse 300 mg day 1 & day 15 10 mL 1   • rosuvastatin (CRESTOR) 10 mg tablet Take 10 mg by mouth 3 (three) times a week (Mon, Wed, Fri).       No current facility-administered medications for this visit.         Family History: History reviewed. No pertinent family history.    Social History:   Social History     Socioeconomic History   • Marital status:      Spouse name: None   • Number of children: 2   • Years of education: None   • Highest education level: None   Occupational History   • Occupation: Retired   Tobacco Use   • Smoking status: Never Smoker   • Smokeless tobacco: Never Used   Substance and Sexual Activity   • Alcohol use: Yes     Alcohol/week: 1.0 standard drinks     Types: 1 Glasses of wine per week   • Drug use: None   • Sexual activity: None   Other Topics Concern   • None   Social History Narrative   • None     Social Determinants of Health     Financial Resource Strain:    • Difficulty of Paying Living Expenses:    Food Insecurity:    • Worried About Running Out of Food in the Last Year:    • Ran Out of Food in the Last Year:    Transportation Needs:    • Lack of Transportation (Medical):    • Lack of Transportation (Non-Medical):    Physical Activity:    • Days of Exercise per Week:    • Minutes of Exercise per Session:    Stress:    • Feeling of Stress :    Social Connections:    • Frequency of Communication with Friends and Family:    • Frequency of  "Social Gatherings with Friends and Family:    • Attends Catholic Services:    • Active Member of Clubs or Organizations:    • Attends Club or Organization Meetings:    • Marital Status:    Intimate Partner Violence:    • Fear of Current or Ex-Partner:    • Emotionally Abused:    • Physically Abused:    • Sexually Abused:          Objective     Physical Exam  Visit Vitals  /78   Pulse 67   Ht 1.803 m (5' 11\")   Wt 68 kg (150 lb)   BMI 20.92 kg/m²     Higher cortical function: Cognitively clear, language intact.  Cranial nerves: Mild bilateral VINCENT.  Otherwise cranial nerves II through XII normal.  Motor: 3/5 bilateral hip flexor weakness, greater on the right.  Mild R>L dorsiflexor weakness.  Mild slowing of rapid movements  hands, right greater than left.  More moderate slowing in the feet.  Deep tendon reflexes:  bilateral Babinski signs.   Sensory exam: Decreased  vibration ankles.    Cerebellum: No upper extremity dysmetria.  Gait: In a wheelchair.    General: Awake, alert, in no apparent distress.  HEENT: Normocephalic atraumatic.  Eyes: Orbits benign, fundi benign, extraocular motions full.  Neck : Supple, no carotid bruit.    Lungs: Clear bilaterally.  Heart: S1 and S2 normal, regular rate and rhythm, no murmur.  Extremities: Right AFO brace that allows him to plantarflex  Musculoskeletal: No injury or deformity.  Psychiatric: Appropriate and cooperative    Assessment and Plan    Multiple sclerosis (CMS/[facility])  The patient has been initiated on Ocrevus.  We will see how he does in the coming timeframe.  Back in December he had a decline in lower extremity function and responded quickly to prednisone.  He is due for his next Ocrevus infusion in September and I will see him in follow-up in November.    Total time spent 33 minutes.    Arun Meaodws MD  5:01 PM  "

## 2021-07-06 ENCOUNTER — OFFICE VISIT (OUTPATIENT)
Dept: NEUROLOGY | Facility: CLINIC | Age: 66
End: 2021-07-06
Payer: MEDICARE

## 2021-07-06 VITALS
WEIGHT: 150 LBS | BODY MASS INDEX: 21 KG/M2 | SYSTOLIC BLOOD PRESSURE: 140 MMHG | DIASTOLIC BLOOD PRESSURE: 78 MMHG | HEIGHT: 71 IN | HEART RATE: 67 BPM

## 2021-07-06 DIAGNOSIS — G35 MULTIPLE SCLEROSIS (CMS/HCC): Primary | ICD-10-CM

## 2021-07-06 PROBLEM — B19.10 HBV (HEPATITIS B VIRUS) INFECTION: Status: RESOLVED | Noted: 2020-11-02 | Resolved: 2021-07-06

## 2021-07-06 PROCEDURE — 99214 OFFICE O/P EST MOD 30 MIN: CPT | Performed by: PSYCHIATRY & NEUROLOGY

## 2021-07-06 NOTE — LETTER
July 7, 2021     Krunal Alcala DO  [address]  [facility] [address]    Patient: Virgil Edge  YOB: 1955  Date of Visit: 7/6/2021      Dear Dr. Alcala:    Thank you for referring Virgil Edge to me for evaluation. Below are my notes for this consultation.    If you have questions, please do not hesitate to call me. I look forward to following your patient along with you.         Sincerely,        Arun Meadows MD        CC: No Recipients  Arun Meadows MD  7/6/2021  5:01 PM  Signed  Neurology Progress Note    Subjective     Virgil Edge is a 65 y.o. male evaluated regarding multiple sclerosis.       I met the patient for initial evaluation 9/9/2020.  He initially presented with binocular visual impairment 38 years ago.  He improved with oral steroids.  He went on to have other relapses.  He followed with neurologists Dr. Aubrey Bennett at [facility] and subsequently Dr. Alberto Guallpa locally.   He took Copaxone for many years but once he had Medicare he reduced his compliance to about twice weekly.  He indicated his last relapse was about a year ago.  He said he may get a relapse every 1 to 2 years.  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  He denied difficulty with vision or hand function.  He drove with his right foot on the accelerator and left foot on the brake.  He  had a right AFO brace for about 15 years; he got a new one last year.  He had been ambulatory with a walker for about 10 years.  He had rare falls.  His wife is a nurse.  He had an MRI brain scan 9/7/2018 which reported white matter lesions consistent with multiple sclerosis but no enhancement.  MRI cervical spine then showed a left posterior lateral cord high signal at C3-4 and a questionable area lateral at C5.  He had been on Social Security since 2010.  He lived in a townhouse with his wife and had a stair chair x8 months.       The patient returned 10/14/2020. MRI brain 10/13/2020  reported scattered foci of T2 prolongation in the periventricular and deep white matter also involving the corpus callosum.  There were no enhancing abnormalities.  MRI cervical spine showed foci of abnormal signal throughout the cord most notably at C2-3 on the left, C3-4 on the left, C5-6 bilaterally, C7 on the left; there was no pathologic enhancement.  I reviewed images  with the patient and his wife Ashly (who is a nurse).  Laboratory studies 10/5/2020 showed benign chemistries and CBC.  Hepatitis B studies were not obtained yet.  The wife felt that his ambulation had declined in the last 1 year.  He had physical therapy for 2 months this summer through [facility]. Alternative disease modifying therapies were discussed including oral therapies and Ocrevus.     The wife phoned 12/14/2020 indicating that the patient had enhanced weakness in the lower extremities with some pins-and-needles in the lower back beginning 2 days prior.  This had occurred in the past and the patient would go on to an oral prednisone taper; I sent in a prescription in this regard.     He was seen via telemedicine 12/17/2020.  He said  he bounced back pretty quickly after beginning prednisone 60 mg daily x4 days.    He said that 3 weeks ago he had a UTI that cleared with Cipro twice daily x10 days.  It was after the UTI cleared that he had difficulty with his legs.  He did have a repeat urine culture.  I reviewed his laboratories of 11/23/2020 showing negative hepatitis B studies.  He had 2 months left of his Copaxone and was interested in transitioning to Ocrevus.    He had his first Ocrevus infusions 3/2/2021 and 3/17/2021.  He indicates he tolerated these well.  He did have his Materna COVID-19 vaccinations 2/8/2021 and 3/8/2021.  He drove to the office today in his Farru[employer] Rogue; he uses both feet.  He puts his walker in the back seat.  He was given a wheelchair and escorted back to our office.  When he gets home there are 5 steps  into the front door of their townhouse and he has a railing.  He uses a cane to get in the front door and in the house.  He admits to some difficulty with memory.    Medical History:   Past Medical History:   Diagnosis Date   • BPH (benign prostatic hyperplasia)    • CAD (coronary artery disease)        Surgical History:   Past Surgical History:   Procedure Laterality Date   • CORONARY ANGIOPLASTY WITH STENT PLACEMENT  05/2016    x 3   • TRANSURETHRAL RESECTION OF PROSTATE  2013       Allergies: No known drug allergies    Current Outpatient Medications   Medication Sig Dispense Refill   • aspirin 81 mg enteric coated tablet Take 81 mg by mouth daily.     • cholecalciferol, vitamin D3, 1,000 unit (25 mcg) tablet Take 2,000 Units by mouth daily.     • clonazePAM (klonoPIN) 0.5 mg tablet Take 1 tablet (0.5 mg total) by mouth daily. 30 tablet 5   • cranberry extract 425 mg capsule Take 425 mg by mouth daily.     • metoprolol tartrate (LOPRESSOR) 25 mg tablet Take 12.5 mg by mouth 2 (two) times a day.     • ocrelizumab (OCREVUS) 30 mg/mL solution Infuse 300 mg day 1 & day 15 10 mL 1   • rosuvastatin (CRESTOR) 10 mg tablet Take 10 mg by mouth 3 (three) times a week (Mon, Wed, Fri).       No current facility-administered medications for this visit.         Family History: History reviewed. No pertinent family history.    Social History:   Social History     Socioeconomic History   • Marital status:      Spouse name: None   • Number of children: 2   • Years of education: None   • Highest education level: None   Occupational History   • Occupation: Retired   Tobacco Use   • Smoking status: Never Smoker   • Smokeless tobacco: Never Used   Substance and Sexual Activity   • Alcohol use: Yes     Alcohol/week: 1.0 standard drinks     Types: 1 Glasses of wine per week   • Drug use: None   • Sexual activity: None   Other Topics Concern   • None   Social History Narrative   • None     Social Determinants of Health  "    Financial Resource Strain:    • Difficulty of Paying Living Expenses:    Food Insecurity:    • Worried About Running Out of Food in the Last Year:    • Ran Out of Food in the Last Year:    Transportation Needs:    • Lack of Transportation (Medical):    • Lack of Transportation (Non-Medical):    Physical Activity:    • Days of Exercise per Week:    • Minutes of Exercise per Session:    Stress:    • Feeling of Stress :    Social Connections:    • Frequency of Communication with Friends and Family:    • Frequency of Social Gatherings with Friends and Family:    • Attends Jew Services:    • Active Member of Clubs or Organizations:    • Attends Club or Organization Meetings:    • Marital Status:    Intimate Partner Violence:    • Fear of Current or Ex-Partner:    • Emotionally Abused:    • Physically Abused:    • Sexually Abused:          Objective     Physical Exam  Visit Vitals  /78   Pulse 67   Ht 1.803 m (5' 11\")   Wt 68 kg (150 lb)   BMI 20.92 kg/m²     Higher cortical function: Cognitively clear, language intact.  Cranial nerves: Mild bilateral VINCENT.  Otherwise cranial nerves II through XII normal.  Motor: 3/5 bilateral hip flexor weakness, greater on the right.  Mild R>L dorsiflexor weakness.  Mild slowing of rapid movements  hands, right greater than left.  More moderate slowing in the feet.  Deep tendon reflexes:  bilateral Babinski signs.   Sensory exam: Decreased  vibration ankles.    Cerebellum: No upper extremity dysmetria.  Gait: In a wheelchair.    General: Awake, alert, in no apparent distress.  HEENT: Normocephalic atraumatic.  Eyes: Orbits benign, fundi benign, extraocular motions full.  Neck : Supple, no carotid bruit.    Lungs: Clear bilaterally.  Heart: S1 and S2 normal, regular rate and rhythm, no murmur.  Extremities: Right AFO brace that allows him to plantarflex  Musculoskeletal: No injury or deformity.  Psychiatric: Appropriate and cooperative    Assessment and Plan    Multiple " sclerosis (CMS/HCC)  The patient has been initiated on Ocrevus.  We will see how he does in the coming timeframe.  Back in December he had a decline in lower extremity function and responded quickly to prednisone.  He is due for his next Ocrevus infusion in September and I will see him in follow-up in November.    Total time spent 33 minutes.    Arun Meadows MD  5:01 PM

## 2021-07-06 NOTE — ASSESSMENT & PLAN NOTE
The patient has been initiated on Ocrevus.  We will see how he does in the coming timeframe.  Back in December he had a decline in lower extremity function and responded quickly to prednisone.  He is due for his next Ocrevus infusion in September and I will see him in follow-up in November.

## 2021-07-21 RX ORDER — BACLOFEN 10 MG/1
10 TABLET ORAL 2 TIMES DAILY
Qty: 180 TABLET | Refills: 1 | Status: SHIPPED | OUTPATIENT
Start: 2021-07-21 | End: 2022-03-29 | Stop reason: SDUPTHER

## 2021-07-21 NOTE — TELEPHONE ENCOUNTER
Patient called stating he would like to go back onto the Baclofen because its cheaper. He was taking 10mg twice daily. Please send medication to [facility]. Please advice.

## 2021-09-07 ENCOUNTER — TELEPHONE (OUTPATIENT)
Dept: NEUROLOGY | Facility: CLINIC | Age: 66
End: 2021-09-07

## 2021-09-07 NOTE — TELEPHONE ENCOUNTER
Gena called she said that yesterday Virgil was treated at [facility] for a laceration to the face. She said that the hospital recommended that he receive a tetanus shot, however he just got a ocrevus infusion. She would like to speak with you about if he should wait to get the shot.

## 2021-09-07 NOTE — TELEPHONE ENCOUNTER
Phoned and discussed.  He fell at home and struck his face on a wooden stool with a laceration that got repaired at the [facility].  He was told to get a tetanus shot because of the laceration.  There was no metal involved.  I told the wife that with patients on Ocrevus, getting vaccines in the mid cycle has been suggested.  This would be early December for this patient.  Given the fact that COVID-19 booster vaccinations are likely to be suggested, I would certainly prioritize that over getting a tetanus vaccine as to vaccine should not be given in close proximity to 1 another.

## 2021-10-29 DIAGNOSIS — G35 MULTIPLE SCLEROSIS (CMS/HCC): ICD-10-CM

## 2021-10-29 RX ORDER — CLONAZEPAM 0.5 MG/1
0.5 TABLET ORAL DAILY
Qty: 30 TABLET | Refills: 0 | Status: SHIPPED | OUTPATIENT
Start: 2021-10-29 | End: 2021-11-17 | Stop reason: SDUPTHER

## 2021-11-17 ENCOUNTER — OFFICE VISIT (OUTPATIENT)
Dept: NEUROLOGY | Facility: CLINIC | Age: 66
End: 2021-11-17
Payer: MEDICARE

## 2021-11-17 VITALS
SYSTOLIC BLOOD PRESSURE: 126 MMHG | BODY MASS INDEX: 21 KG/M2 | HEART RATE: 68 BPM | WEIGHT: 150 LBS | HEIGHT: 71 IN | TEMPERATURE: 97.6 F | DIASTOLIC BLOOD PRESSURE: 76 MMHG

## 2021-11-17 DIAGNOSIS — G35 MULTIPLE SCLEROSIS (CMS/HCC): Primary | ICD-10-CM

## 2021-11-17 PROCEDURE — 99214 OFFICE O/P EST MOD 30 MIN: CPT | Performed by: PSYCHIATRY & NEUROLOGY

## 2021-11-17 RX ORDER — CLONAZEPAM 0.5 MG/1
0.5 TABLET ORAL DAILY
Qty: 90 TABLET | Refills: 1 | Status: SHIPPED | OUTPATIENT
Start: 2021-11-17 | End: 2021-11-30 | Stop reason: SDUPTHER

## 2021-11-17 NOTE — ASSESSMENT & PLAN NOTE
The patient will continue Ocrevus as disease modifying therapy.  He is baseline tenuous gait function.  He declined after his fall with facial trauma 9/2021.  He is pursuing physical therapies.  He has not been driving.  I expressed my concern regarding his driving given limited right distal lower extremity motor functions.  We might consider practicing in a parking lot first.  He will continue home therapies.  I will see him in follow-up in 6 months.  His next Ocrevus should be in March.

## 2021-11-17 NOTE — PROGRESS NOTES
Neurology Progress Note    Subjective     Virgil Edge is a 66 y.o. male evaluated regarding multiple sclerosis.       I met the patient for initial evaluation 9/9/2020.  He initially presented with binocular visual impairment 38 years ago.  He improved with oral steroids.  He went on to have other relapses.  He followed with neurologists Dr. Aubrey Bennett at [facility] and subsequently Dr. Alberto Guallpa locally.   He took Copaxone for many years but once he had Medicare he reduced his compliance to about twice weekly.  He indicated his last relapse was about a year ago.  He said he may get a relapse every 1 to 2 years.  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  He denied difficulty with vision or hand function.  He drove with his right foot on the accelerator and left foot on the brake.  He  had a right AFO brace for about 15 years; he got a new one last year.  He had been ambulatory with a walker for about 10 years.  He had rare falls.  His wife is a nurse.  He had an MRI brain scan 9/7/2018 which reported white matter lesions consistent with multiple sclerosis but no enhancement.  MRI cervical spine then showed a left posterior lateral cord high signal at C3-4 and a questionable area lateral at C5.  He had been on Social Security since 2010.  He lived in a townhouse with his wife and had a stair chair x8 months.     The patient returned 10/14/2020. MRI brain 10/13/2020 reported scattered foci of T2 prolongation in the periventricular and deep white matter also involving the corpus callosum.  There were no enhancing abnormalities.  MRI cervical spine showed foci of abnormal signal throughout the cord most notably at C2-3 on the left, C3-4 on the left, C5-6 bilaterally, C7 on the left; there was no pathologic enhancement.  I reviewed images  with the patient and his wife Ashly (who is a nurse).  Laboratory studies 10/5/2020 showed benign chemistries and CBC.  Hepatitis B studies were not  obtained yet.  The wife felt that his ambulation had declined in the last 1 year.  He had physical therapy for 2 months this summer through [facility]. Alternative disease modifying therapies were discussed including oral therapies and Ocrevus.     The wife phoned 12/14/2020 indicating that the patient had enhanced weakness in the lower extremities with some pins-and-needles in the lower back beginning 2 days prior.  This had occurred in the past and the patient would go on to an oral prednisone taper; I sent in a prescription in this regard.     He was seen via telemedicine 12/17/2020.  He said  he bounced back pretty quickly after beginning prednisone 60 mg daily x4 days.    He said that 3 weeks ago he had a UTI that cleared with Cipro twice daily x10 days.  It was after the UTI cleared that he had difficulty with his legs.  He did have a repeat urine culture.  I reviewed his laboratories of 11/23/2020 showing negative hepatitis B studies.  He had 2 months left of his Copaxone and was interested in transitioning to Ocrevus.     He had his first Ocrevus infusions 3/2/2021 and 3/17/2021.  When seen 7/6/2021 he indicated that he tolerated these well.  He did have his Modernna COVID-19 vaccinations 2/8/2021 and 3/8/2021.  He drove to the office in his [employer] Rogue and drove with both feet.  He put his walker in the back seat.  He was given a wheelchair and escorted back to our office.  At home there are 5 steps into the front door of their townhouse and he has a railing.  He used a cane to get in the front door and in the house.  He admitted to some difficulty with memory.    I spoke to him by phone 9/7/2021. He fell at home and struck his face on a wooden stool with a laceration that got repaired at the [facility]. We discussed tetanus and COVID-19 booster vaccinations with reference to timing of Ocrevus.  He returns today with his wife.  He says that he had a really large left black eye that went away in  about 1 week.  He had stitches in the left eyebrow and below the left eye.  His walking declined thereafter.  He is using a walker upstairs and downstairs but not a cane.  He has been getting home physical therapy twice weekly.  He has not driven since his fall.  He always wears his right carbon fiber AFO.     Medical History:       Medical History:   Past Medical History:   Diagnosis Date   • BPH (benign prostatic hyperplasia)    • CAD (coronary artery disease)        Surgical History:   Past Surgical History:   Procedure Laterality Date   • CORONARY ANGIOPLASTY WITH STENT PLACEMENT  05/2016    x 3   • TRANSURETHRAL RESECTION OF PROSTATE  2013       Allergies: Fluconazole    Current Outpatient Medications   Medication Sig Dispense Refill   • aspirin 81 mg enteric coated tablet Take 81 mg by mouth daily.     • baclofen (LIORESAL) 10 mg tablet Take 1 tablet (10 mg total) by mouth 2 (two) times a day. 180 tablet 1   • cholecalciferol, vitamin D3, 1,000 unit (25 mcg) tablet Take 2,000 Units by mouth daily.     • clonazePAM 0.5 mg tablet Take 1 tablet (0.5 mg total) by mouth daily. 90 tablet 1   • cranberry extract 425 mg capsule Take 425 mg by mouth daily.     • metoprolol tartrate (LOPRESSOR) 25 mg tablet Take 12.5 mg by mouth 2 (two) times a day.     • ocrelizumab (OCREVUS) 30 mg/mL solution Infuse 300 mg day 1 & day 15 10 mL 1   • rosuvastatin (CRESTOR) 10 mg tablet Take 10 mg by mouth 3 (three) times a week (Mon, Wed, Fri).       No current facility-administered medications for this visit.         Family History: History reviewed. No pertinent family history.    Social History:   Social History     Socioeconomic History   • Marital status:      Spouse name: None   • Number of children: 2   • Years of education: None   • Highest education level: None   Occupational History   • Occupation: Retired   Tobacco Use   • Smoking status: Never Smoker   • Smokeless tobacco: Never Used   Substance and Sexual Activity  "  • Alcohol use: Yes     Alcohol/week: 1.0 standard drink     Types: 1 Glasses of wine per week   • Drug use: None   • Sexual activity: None   Other Topics Concern   • None   Social History Narrative   • None     Social Determinants of Health     Financial Resource Strain:    • Difficulty of Paying Living Expenses: Not on file   Food Insecurity:    • Worried About Running Out of Food in the Last Year: Not on file   • Ran Out of Food in the Last Year: Not on file   Transportation Needs:    • Lack of Transportation (Medical): Not on file   • Lack of Transportation (Non-Medical): Not on file   Physical Activity:    • Days of Exercise per Week: Not on file   • Minutes of Exercise per Session: Not on file   Stress:    • Feeling of Stress : Not on file   Social Connections:    • Frequency of Communication with Friends and Family: Not on file   • Frequency of Social Gatherings with Friends and Family: Not on file   • Attends Hoahaoism Services: Not on file   • Active Member of Clubs or Organizations: Not on file   • Attends Club or Organization Meetings: Not on file   • Marital Status: Not on file   Intimate Partner Violence:    • Fear of Current or Ex-Partner: Not on file   • Emotionally Abused: Not on file   • Physically Abused: Not on file   • Sexually Abused: Not on file   Housing Stability:    • Unable to Pay for Housing in the Last Year: Not on file   • Number of Places Lived in the Last Year: Not on file   • Unstable Housing in the Last Year: Not on file           Objective     Physical Exam  Visit Vitals  /76   Pulse 68   Temp 36.4 °C (97.6 °F)   Ht 1.803 m (5' 11\")   Wt 68 kg (150 lb)   BMI 20.92 kg/m²     Higher cortical function: Cognitively clear, language intact.  Cranial nerves: Mild bilateral VINCENT.  Otherwise cranial nerves II through XII normal.  Motor: 3/5 bilateral hip flexor weakness, greater on the right.  Right dorsiflexor weakness.  Mild slowing of rapid movements  hands, right greater than left. "  More moderate slowing in the feet.  Relative right lower leg muscle atrophy.  Deep tendon reflexes:   Brisk knees with crossed adductors, 1+ ankles, bilateral Babinski signs.   Sensory exam: Decreased  vibration right ankle and left.    Cerebellum: No upper extremity dysmetria.  Gait: In a wheelchair.    General: Awake, alert, in no apparent distress.  HEENT: Normocephalic atraumatic.  Eyes: Orbits benign, extraocular motions full.  Neck : Supple, no carotid bruit.    Lungs: Clear bilaterally.  Heart: S1 and S2 normal, regular rate and rhythm, no murmur.  Extremities: Right AFO brace  Musculoskeletal: No injury or deformity.  Psychiatric: Appropriate and cooperative  Assessment and Plan    Multiple sclerosis (CMS/[facility])  The patient will continue Ocrevus as disease modifying therapy.  He is baseline tenuous gait function.  He declined after his fall with facial trauma 9/2021.  He is pursuing physical therapies.  He has not been driving.  I expressed my concern regarding his driving given limited right distal lower extremity motor functions.  We might consider practicing in a parking lot first.  He will continue home therapies.  I will see him in follow-up in 6 months.  His next Ocrevus should be in March.    Total time spent 32 minutes.  Arun Meadows MD  4:40 PM

## 2021-11-30 DIAGNOSIS — G35 MULTIPLE SCLEROSIS (CMS/HCC): ICD-10-CM

## 2021-11-30 RX ORDER — CLONAZEPAM 0.5 MG/1
0.5 TABLET ORAL DAILY
Qty: 90 TABLET | Refills: 1 | Status: SHIPPED | OUTPATIENT
Start: 2021-11-30 | End: 2022-06-06 | Stop reason: SDUPTHER

## 2022-02-23 ENCOUNTER — TELEPHONE (OUTPATIENT)
Dept: NEUROLOGY | Facility: CLINIC | Age: 67
End: 2022-02-23
Payer: MEDICARE

## 2022-02-23 DIAGNOSIS — G35 MULTIPLE SCLEROSIS (CMS/HCC): Primary | ICD-10-CM

## 2022-02-23 NOTE — TELEPHONE ENCOUNTER
[facility] sent a fax statong if you could write a script for Ocrevus 600mg/ iv every 6 months w / refill. Please print script.

## 2022-03-30 RX ORDER — BACLOFEN 10 MG/1
10 TABLET ORAL 2 TIMES DAILY
Qty: 180 TABLET | Refills: 3 | Status: SHIPPED | OUTPATIENT
Start: 2022-03-30 | End: 2022-04-14 | Stop reason: SDUPTHER

## 2022-04-14 RX ORDER — BACLOFEN 10 MG/1
10 TABLET ORAL 2 TIMES DAILY
Qty: 180 TABLET | Refills: 3 | Status: SHIPPED | OUTPATIENT
Start: 2022-04-14 | End: 2023-08-18 | Stop reason: SDUPTHER

## 2022-05-17 ENCOUNTER — OFFICE VISIT (OUTPATIENT)
Dept: NEUROLOGY | Facility: CLINIC | Age: 67
End: 2022-05-17
Payer: MEDICARE

## 2022-05-17 VITALS
BODY MASS INDEX: 20.86 KG/M2 | HEART RATE: 56 BPM | TEMPERATURE: 97.3 F | HEIGHT: 71 IN | OXYGEN SATURATION: 97 % | SYSTOLIC BLOOD PRESSURE: 120 MMHG | DIASTOLIC BLOOD PRESSURE: 84 MMHG | WEIGHT: 149 LBS

## 2022-05-17 DIAGNOSIS — G35 MULTIPLE SCLEROSIS (CMS/HCC): Primary | ICD-10-CM

## 2022-05-17 PROCEDURE — 99213 OFFICE O/P EST LOW 20 MIN: CPT | Performed by: PSYCHIATRY & NEUROLOGY

## 2022-05-17 NOTE — PROGRESS NOTES
Neurology Progress Note    Subjective     Virgil Edge is a 66 y.o. male evaluated regarding   multiple sclerosis.       I met the patient for initial evaluation 9/9/2020.  He initially presented with binocular visual impairment 38 years ago.  He improved with oral steroids.  He went on to have other relapses.  He followed with neurologists Dr. Aubrey Bennett at [facility] and subsequently Dr. Alberto Guallpa locally.   He took Copaxone for many years but once he had Medicare he reduced his compliance to about twice weekly.  He indicated his last relapse was about a year ago.  He said he may get a relapse every 1 to 2 years.  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  He denied difficulty with vision or hand function.  He drove with his right foot on the accelerator and left foot on the brake.  He  had a right AFO brace for about 15 years; he got a new one last year.  He had been ambulatory with a walker for about 10 years.  He had rare falls.  His wife is a nurse.  He had an MRI brain scan 9/7/2018 which reported white matter lesions consistent with multiple sclerosis but no enhancement.  MRI cervical spine then showed a left posterior lateral cord high signal at C3-4 and a questionable area lateral at C5.  He had been on Social Security since 2010.  He lived in a townhouse with his wife and had a stair chair x8 months.     The patient returned 10/14/2020. MRI brain 10/13/2020 reported scattered foci of T2 prolongation in the periventricular and deep white matter also involving the corpus callosum.  There were no enhancing abnormalities.  MRI cervical spine showed foci of abnormal signal throughout the cord most notably at C2-3 on the left, C3-4 on the left, C5-6 bilaterally, C7 on the left; there was no pathologic enhancement.  I reviewed images  with the patient and his wife Ashly (who is a nurse).  Laboratory studies 10/5/2020 showed benign chemistries and CBC.  Hepatitis B studies were not  obtained yet.  The wife felt that his ambulation had declined in the last 1 year.  He had physical therapy for 2 months this summer through [facility]. Alternative disease modifying therapies were discussed including oral therapies and Ocrevus.     The wife phoned 12/14/2020 indicating that the patient had enhanced weakness in the lower extremities with some pins-and-needles in the lower back beginning 2 days prior.  This had occurred in the past and the patient would go on to an oral prednisone taper; I sent in a prescription in this regard.     He was seen via telemedicine 12/17/2020.  He said  he bounced back pretty quickly after beginning prednisone 60 mg daily x4 days.    He said that 3 weeks ago he had a UTI that cleared with Cipro twice daily x10 days.  It was after the UTI cleared that he had difficulty with his legs.  He did have a repeat urine culture.  I reviewed his laboratories of 11/23/2020 showing negative hepatitis B studies.  He had 2 months left of his Copaxone and was interested in transitioning to Ocrevus.     He had his first Ocrevus infusions 3/2/2021 and 3/17/2021.  When seen 7/6/2021 he indicated that he tolerated these well.  He did have his Modernna COVID-19 vaccinations 2/8/2021 and 3/8/2021.  He drove to the office in his [employer] and drove with both feet.  He put his walker in the back seat.  He was given a wheelchair and escorted back to our office.  At home there are 5 steps into the front door of their townhouse and he has a railing.  He used a cane to get in the front door and in the house.  He admitted to some difficulty with memory.     I spoke to him by phone 9/7/2021. He fell at home and struck his face on a wooden stool with a laceration that got repaired at the [facility]. We discussed tetanus and COVID-19 booster vaccinations with reference to timing of Ocrevus.  He returned 11/17/2021 with his wife.  He  had a really large left black eye that went away in about  1 week.  He had stitches in the left eyebrow and below the left eye.  His walking declined thereafter.  He was using a walker upstairs and downstairs but not a cane.  He was getting home physical therapy twice weekly.  He had not driven since his fall.  He always wore his right carbon fiber AFO.    At this time the patient is doing well.  He had his last Ocrevus infusion 3/9/2022.  He had benign CBC and chemistries then.  He traveled to a wedding in [place] and visited family on vacation in [place].  Never transport chair for longer distances and he would use a wheelchair in the airport.  He has a walker upstairs and another walker downstairs at his home.  He has a chair lift.  He catheterizes 4 times daily.  He has a scooter for rare usage.  He has been clinically stable.         Medical History:   Past Medical History:   Diagnosis Date   • BPH (benign prostatic hyperplasia)    • CAD (coronary artery disease)        Surgical History:   Past Surgical History:   Procedure Laterality Date   • CORONARY ANGIOPLASTY WITH STENT PLACEMENT  05/2016    x 3   • TRANSURETHRAL RESECTION OF PROSTATE  2013       Allergies: Fluconazole    Current Outpatient Medications   Medication Sig Dispense Refill   • aspirin 81 mg enteric coated tablet Take 81 mg by mouth daily.     • baclofen (LIORESAL) 10 mg tablet Take 1 tablet (10 mg total) by mouth 2 (two) times a day. 180 tablet 3   • cholecalciferol, vitamin D3, 1,000 unit (25 mcg) tablet Take 2,000 Units by mouth daily.     • clonazePAM 0.5 mg tablet Take 1 tablet (0.5 mg total) by mouth daily. 90 tablet 1   • cranberry extract 425 mg capsule Take 425 mg by mouth daily.     • metoprolol tartrate (LOPRESSOR) 25 mg tablet Take 12.5 mg by mouth 2 (two) times a day.     • ocrelizumab (OCREVUS) 30 mg/mL solution Infuse 20 mL (600 mg total) into a venous catheter every 6 (six) months. 20 mL 1   • rosuvastatin (CRESTOR) 10 mg tablet Take 10 mg by mouth 3 (three) times a week (Mon, Wed,  "Fri).       No current facility-administered medications for this visit.         Family History: No family history on file.    Social History:   Social History     Socioeconomic History   • Marital status:      Spouse name: None   • Number of children: 2   • Years of education: None   • Highest education level: None   Occupational History   • Occupation: Retired   Tobacco Use   • Smoking status: Never Smoker   • Smokeless tobacco: Never Used   Substance and Sexual Activity   • Alcohol use: Yes     Alcohol/week: 1.0 standard drink     Types: 1 Glasses of wine per week           Objective     Physical Exam  Visit Vitals  /84   Pulse (!) 56   Temp 36.3 °C (97.3 °F)   Ht 1.803 m (5' 11\")   Wt 67.6 kg (149 lb)   SpO2 97%   BMI 20.78 kg/m²     Higher cortical function: Cognitively clear, language intact.  Cranial nerves: Mild bilateral VINCENT.  Otherwise cranial nerves II through XII normal.  Motor: 3-/5 bilateral hip flexor weakness, greater on the right.   Mild slowing of rapid movements  hands, right greater than left.  .  Cerebellum: No upper extremity dysmetria.  Gait: In a wheelchair.    General: Awake, alert, in no apparent distress.  HEENT: Normocephalic atraumatic.  Eyes: Orbits benign, extraocular motions full.  Neck : Supple, no carotid bruit.    Lungs: Clear bilaterally.  Heart: S1 and S2 normal, regular rate and rhythm, no murmur.  Extremities: Right AFO brace  Musculoskeletal: No injury or deformity.  Psychiatric: Appropriate and cooperative    Assessment and Plan    Multiple sclerosis (CMS/HCC)  Stable neurologically on Ocrevus which he tolerates well.  He has given up driving and I concur.  He has appropriate equipment in the home setting.  We discussed getting his COVID-19 booster mid cycle for Ocrevus which would be June.  I will reassess him in 6 months.      Arun Meadows MD  4:18 PM  "

## 2022-05-17 NOTE — ASSESSMENT & PLAN NOTE
Stable neurologically on Ocrevus which he tolerates well.  He has given up driving and I concur.  He has appropriate equipment in the home setting.  We discussed getting his COVID-19 booster mid cycle for Ocrevus which would be June.  I will reassess him in 6 months.

## 2022-06-06 DIAGNOSIS — G35 MULTIPLE SCLEROSIS (CMS/HCC): ICD-10-CM

## 2022-06-06 RX ORDER — CLONAZEPAM 0.5 MG/1
0.5 TABLET ORAL DAILY
Qty: 90 TABLET | Refills: 1 | Status: SHIPPED | OUTPATIENT
Start: 2022-06-06 | End: 2022-12-06 | Stop reason: SDUPTHER

## 2022-11-14 NOTE — PROGRESS NOTES
Neurology Progress Note    Subjective     Virgil Edge is a 67 y.o. male evaluated regarding multiple sclerosis.  He returns with his wife.     I met the patient for initial evaluation 9/9/2020.  He initially presented with binocular visual impairment 38 years ago.  He improved with oral steroids.  He went on to have other relapses.  He followed with neurologists Dr. Aubrey Bennett at [facility] and subsequently Dr. Alberto Guallpa locally.   He took Copaxone for many years but once he had Medicare he reduced his compliance to about twice weekly.  He indicated his last relapse was about a year ago.  He said he may get a relapse every 1 to 2 years.  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  He denied difficulty with vision or hand function.  He drove with his right foot on the accelerator and left foot on the brake.  He  had a right AFO brace for about 15 years; he got a new one last year.  He had been ambulatory with a walker for about 10 years.  He had rare falls.  His wife is a nurse.  He had an MRI brain scan 9/7/2018 which reported white matter lesions consistent with multiple sclerosis but no enhancement.  MRI cervical spine then showed a left posterior lateral cord high signal at C3-4 and a questionable area lateral at C5.  He had been on Social Security since 2010.  He lived in a townhouse with his wife and had a stair chair x8 months.     The patient returned 10/14/2020. MRI brain 10/13/2020 reported scattered foci of T2 prolongation in the periventricular and deep white matter also involving the corpus callosum.  There were no enhancing abnormalities.  MRI cervical spine showed foci of abnormal signal throughout the cord most notably at C2-3 on the left, C3-4 on the left, C5-6 bilaterally, C7 on the left; there was no pathologic enhancement.  I reviewed images  with the patient and his wife Ashly (who is a nurse).  Laboratory studies 10/5/2020 showed benign chemistries and CBC.  Hepatitis B  studies were not obtained yet.  The wife felt that his ambulation had declined in the last 1 year.  He had physical therapy for 2 months this summer through [facility]. Alternative disease modifying therapies were discussed including oral therapies and Ocrevus.     The wife phoned 12/14/2020 indicating that the patient had enhanced weakness in the lower extremities with some pins-and-needles in the lower back beginning 2 days prior.  This had occurred in the past and the patient would go on to an oral prednisone taper; I sent in a prescription in this regard.     He was seen via telemedicine 12/17/2020.  He said  he bounced back pretty quickly after beginning prednisone 60 mg daily x4 days.    He said that 3 weeks ago he had a UTI that cleared with Cipro twice daily x10 days.  It was after the UTI cleared that he had difficulty with his legs.  He did have a repeat urine culture.  I reviewed his laboratories of 11/23/2020 showing negative hepatitis B studies.  He had 2 months left of his Copaxone and was interested in transitioning to Ocrevus.     He had his first Ocrevus infusions 3/2/2021 and 3/17/2021.  When seen 7/6/2021 he indicated that he tolerated these well.  He did have his Modernna COVID-19 vaccinations 2/8/2021 and 3/8/2021.  He drove to the office in his [employer] and drove with both feet.  He put his walker in the back seat.  He was given a wheelchair and escorted back to our office.  At home there are 5 steps into the front door of their townhouse and he has a railing.  He used a cane to get in the front door and in the house.  He admitted to some difficulty with memory.     I spoke to him by phone 9/7/2021. He fell at home and struck his face on a wooden stool with a laceration that got repaired at the [facility]. We discussed tetanus and COVID-19 booster vaccinations with reference to timing of Ocrevus.  He returned 11/17/2021 with his wife.  He  had a really large left black eye that  went away in about 1 week.  He had stitches in the left eyebrow and below the left eye.  His walking declined thereafter.  He was using a walker upstairs and downstairs but not a cane.  He was getting home physical therapy twice weekly.  He had not driven since his fall.  He always wore his right carbon fiber AFO.     At this time the patient is doing well.  He had his last Ocrevus infusion 9/7//2022.  Functionally he is at a plateau.  He has not had any flares or falls or UTIs.  He uses a scooter rarely.  He gave up driving and they sold the car. He has a walker upstairs and another walker downstairs at his home.  He has a chair lift.  He catheterizes 4 times daily.         Medical History:   Past Medical History:   Diagnosis Date    BPH (benign prostatic hyperplasia)     CAD (coronary artery disease)        Surgical History:   Past Surgical History:   Procedure Laterality Date    CORONARY ANGIOPLASTY WITH STENT PLACEMENT  05/2016    x 3    TRANSURETHRAL RESECTION OF PROSTATE  2013       Allergies: Fluconazole    Current Outpatient Medications   Medication Sig Dispense Refill    aspirin 81 mg enteric coated tablet Take 81 mg by mouth daily.      baclofen (LIORESAL) 10 mg tablet Take 1 tablet (10 mg total) by mouth 2 (two) times a day. 180 tablet 3    cholecalciferol, vitamin D3, 1,000 unit (25 mcg) tablet Take 2,000 Units by mouth daily.      clonazePAM (klonoPIN) 0.5 mg tablet Take 1 tablet (0.5 mg total) by mouth daily. 90 tablet 1    cranberry extract 425 mg capsule Take 425 mg by mouth daily.      metoprolol tartrate (LOPRESSOR) 25 mg tablet Take 12.5 mg by mouth 2 (two) times a day.      ocrelizumab (OCREVUS) 30 mg/mL solution Infuse 20 mL (600 mg total) into a venous catheter every 6 (six) months. 20 mL 1    rosuvastatin (CRESTOR) 10 mg tablet Take 10 mg by mouth 3 (three) times a week (Mon, Wed, Fri).       No current facility-administered medications for this visit.         Family History: No  "family history on file.    Social History:   Social History     Socioeconomic History    Marital status:      Spouse name: None    Number of children: 2    Years of education: None    Highest education level: None   Occupational History    Occupation: Retired   Tobacco Use    Smoking status: Never    Smokeless tobacco: Never   Substance and Sexual Activity    Alcohol use: Yes     Alcohol/week: 1.0 standard drink     Types: 1 Glasses of wine per week           Objective     Physical Exam  Visit Vitals  /70   Pulse 71   Temp 36.3 °C (97.3 °F)   Ht 1.803 m (5' 11\")   Wt 68 kg (150 lb)   SpO2 99%   BMI 20.92 kg/m²       Higher cortical function: Cognitively clear, language intact.  Cranial nerves: Mild bilateral VINCENT greater with left gaze.  Otherwise cranial nerves II through XII normal.  Motor: 3-/5 bilateral hip flexor weakness, greater on the right.   Mild slowing of rapid movements  hands, right greater than left.  Mild outflow tremor with finger-to-nose.  Cerebellum: No upper extremity dysmetria.  Gait: In a wheelchair.    General: Awake, alert, in no apparent distress.  HEENT: Normocephalic atraumatic.  Eyes: Orbits benign, extraocular motions full.  Neck : Supple, no carotid bruit.    Lungs: Clear bilaterally.  Heart: S1 and S2 normal, regular rate and rhythm, no murmur.  Extremities: Right AFO brace  Musculoskeletal: No injury or deformity.  Psychiatric: Appropriate and cooperative  Assessment and Plan    Multiple sclerosis (CMS/HCC)  Remained stable neurologically.  Continue Ocrevus as disease modifying therapy.  Discussed maintaining function by continuing to walk with a walker.  He may be interested in resuming physical therapies.  We discussed the MS program at  [facility] gym and he would consider swimming there.    Neurogenic bladder  Self-catheterization dependent.    Total time spent 32 minutes  Arun Meadows MD  4:31 PM  "

## 2022-11-15 ENCOUNTER — OFFICE VISIT (OUTPATIENT)
Dept: NEUROLOGY | Facility: CLINIC | Age: 67
End: 2022-11-15
Payer: MEDICARE

## 2022-11-15 VITALS
OXYGEN SATURATION: 99 % | HEIGHT: 71 IN | TEMPERATURE: 97.3 F | DIASTOLIC BLOOD PRESSURE: 70 MMHG | HEART RATE: 71 BPM | WEIGHT: 150 LBS | SYSTOLIC BLOOD PRESSURE: 130 MMHG | BODY MASS INDEX: 21 KG/M2

## 2022-11-15 DIAGNOSIS — G35 MULTIPLE SCLEROSIS (CMS/HCC): Primary | ICD-10-CM

## 2022-11-15 DIAGNOSIS — N31.9 NEUROGENIC BLADDER: ICD-10-CM

## 2022-11-15 PROBLEM — N39.0 URINARY TRACT INFECTIOUS DISEASE: Status: ACTIVE | Noted: 2017-04-24

## 2022-11-15 PROCEDURE — 99214 OFFICE O/P EST MOD 30 MIN: CPT | Performed by: PSYCHIATRY & NEUROLOGY

## 2022-11-15 NOTE — ASSESSMENT & PLAN NOTE
Remained stable neurologically.  Continue Ocrevus as disease modifying therapy.  Discussed maintaining function by continuing to walk with a walker.  He may be interested in resuming physical therapies.  We discussed the MS program at  [facility] gym and he would consider swimming there.

## 2022-11-21 ENCOUNTER — TELEPHONE (OUTPATIENT)
Dept: NEUROLOGY | Facility: CLINIC | Age: 67
End: 2022-11-21
Payer: MEDICARE

## 2022-12-06 DIAGNOSIS — G35 MULTIPLE SCLEROSIS (CMS/HCC): ICD-10-CM

## 2022-12-06 RX ORDER — CLONAZEPAM 0.5 MG/1
0.5 TABLET ORAL DAILY
Qty: 90 TABLET | Refills: 1 | Status: SHIPPED | OUTPATIENT
Start: 2022-12-06 | End: 2022-12-07 | Stop reason: SDUPTHER

## 2022-12-07 DIAGNOSIS — G35 MULTIPLE SCLEROSIS (CMS/HCC): ICD-10-CM

## 2022-12-07 RX ORDER — CLONAZEPAM 0.5 MG/1
0.5 TABLET ORAL DAILY
Qty: 90 TABLET | Refills: 1 | Status: SHIPPED | OUTPATIENT
Start: 2022-12-07 | End: 2023-06-08

## 2022-12-07 NOTE — TELEPHONE ENCOUNTER
Patient requesting be sent to [facility] not [facility]     pdmp 09/05/2022    clonazePAM (Tablet)  0.5 MG JUDITH BARRAGAN

## 2023-03-06 DIAGNOSIS — G35 MULTIPLE SCLEROSIS (CMS/HCC): ICD-10-CM

## 2023-06-08 DIAGNOSIS — G35 MULTIPLE SCLEROSIS (CMS/HCC): ICD-10-CM

## 2023-06-08 RX ORDER — CLONAZEPAM 0.5 MG/1
0.5 TABLET ORAL DAILY
Qty: 90 TABLET | Refills: 1 | Status: SHIPPED | OUTPATIENT
Start: 2023-06-08 | End: 2023-12-08 | Stop reason: SDUPTHER

## 2023-07-11 ENCOUNTER — OFFICE VISIT (OUTPATIENT)
Dept: NEUROLOGY | Facility: CLINIC | Age: 68
End: 2023-07-11
Payer: MEDICARE

## 2023-07-11 VITALS
BODY MASS INDEX: 21.28 KG/M2 | WEIGHT: 152 LBS | SYSTOLIC BLOOD PRESSURE: 120 MMHG | HEIGHT: 71 IN | DIASTOLIC BLOOD PRESSURE: 80 MMHG | HEART RATE: 76 BPM | OXYGEN SATURATION: 95 %

## 2023-07-11 DIAGNOSIS — T50.905D ADVERSE EFFECT OF DRUG, SUBSEQUENT ENCOUNTER: ICD-10-CM

## 2023-07-11 DIAGNOSIS — R13.12 DYSPHAGIA, OROPHARYNGEAL PHASE: ICD-10-CM

## 2023-07-11 DIAGNOSIS — G35 MULTIPLE SCLEROSIS (CMS/HCC): Primary | ICD-10-CM

## 2023-07-11 PROCEDURE — 99214 OFFICE O/P EST MOD 30 MIN: CPT | Performed by: PSYCHIATRY & NEUROLOGY

## 2023-07-11 RX ORDER — ESCITALOPRAM OXALATE 10 MG/1
10 TABLET ORAL DAILY
COMMUNITY
Start: 2023-05-08

## 2023-07-11 NOTE — ASSESSMENT & PLAN NOTE
Patient has occasional choking and swallowing problems.  I have asked him to meet with speech therapy.

## 2023-07-11 NOTE — PROGRESS NOTES
Neurology Progress Note    Subjective     Virgil Edge is a 67 y.o. male evaluated regarding multiple sclerosis.  He returns with his wife Ashly who is a nurse.     I initially evaluated Bennie 9/9/2020.  He initially presented with binocular visual impairment 38 years ago.  He improved with oral steroids.  He went on to have other relapses.  He followed with neurologists Dr. Aubrey Bennett at [facility] and subsequently Dr. Alberto Guallpa locally. He took Copaxone for many years but once he had Medicare he reduced his compliance to about twice weekly.  He indicated his last relapse was about a year ago.  He said he may get a relapse every 1 to 2 years.  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  He denied difficulty with vision or hand function.  He drove with his right foot on the accelerator and left foot on the brake.  He  had a right AFO brace for about 15 years; he got a new one last year.  He had been ambulatory with a walker for about 10 years.  He had rare falls.  His wife is a nurse.  MRI brain 9/7/2018 which reported white matter lesions consistent with multiple sclerosis but no enhancement.  MRI cervical spine then showed a left posterior lateral cord high signal at C3-4 and a questionable area lateral at C5.  He had been on Social Security since 2010.  He lived in a townhouse with his wife and had a stair chair x8 months.     The patient returned 10/14/2020. MRI brain 10/13/2020 reported scattered foci of T2 prolongation in the periventricular and deep white matter also involving the corpus callosum.  There were no enhancing abnormalities.  MRI cervical spine showed foci of abnormal signal throughout the cord most notably at C2-3 on the left, C3-4 on the left, C5-6 bilaterally, C7 on the left; there was no pathologic enhancement.  Laboratory studies 10/5/2020 showed benign chemistries and CBC.  Hepatitis B studies were not obtained yet.  The wife felt that his ambulation had declined in  the last 1 year.  He had physical therapy for 2 months over the summer through [facility]. Alternative disease modifying therapies were discussed including oral therapies and Ocrevus.     The wife phoned 12/14/2020 indicating that the patient had enhanced weakness in the lower extremities with some pins-and-needles in the lower back beginning 2 days prior.  This had occurred in the past and the patient would go on to an oral prednisone taper; I sent in a prescription in this regard.     He was seen via telemedicine 12/17/2020.  He said  he bounced back pretty quickly after beginning prednisone 60 mg daily x4 days.    He said that 3 weeks ago he had a UTI that cleared with Cipro twice daily x10 days.  It was after the UTI cleared that he had difficulty with his legs.  He did have a repeat urine culture.  I reviewed his laboratories of 11/23/2020 showing negative hepatitis B studies.  He had 2 months left of his Copaxone and was interested in transitioning to Ocrevus.     He had his first Ocrevus infusions 3/2/2021 and 3/17/2021.  When seen 7/6/2021 he indicated that he tolerated these well.  He did have his Modernna COVID-19 vaccinations 2/8/2021 and 3/8/2021.  He drove to the office in his [employer] and drove with both feet.  He put his walker in the back seat.  He was given a wheelchair and escorted back to our office.  At home there are 5 steps into the front door of their townhouse and he has a railing.  He used a cane to get in the front door and in the house.  He admitted to some difficulty with memory.     I spoke to him by phone 9/7/2021. He fell at home and struck his face on a wooden stool with a laceration that got repaired at the [facility]. We discussed tetanus and COVID-19 booster vaccinations with reference to timing of Ocrevus.  He returned 11/17/2021 with his wife.  He  had a really large left black eye that went away in about 1 week.  He had stitches in the left eyebrow and below the  left eye.  His walking declined thereafter.  He was using a walker upstairs and downstairs but not a cane.  He was getting home physical therapy twice weekly.  He had not driven since his fall.  He always wore his right carbon fiber AFO.     When seen 11/15/2022 he was doing well.  He had his last Ocrevus infusion 9/7//2022.  Functionally he was at a plateau.  He denied any flares or falls or UTIs.  He used a scooter rarely.  He gave up driving and they sold the car. He had a walker upstairs and another walker downstairs at his home.  He had a chair lift.  He catheterized 4 times daily.        At this time he reports left lower thoracic shingles about 4 months ago.  He recovered fully.  He says that about once per month he has choking and swallowing difficulties.  He is interested in seeing a speech therapist.  He can ambulate with a walker up to 25 yards.  He has not been involved with any recent physical therapies.    Medical History:   Past Medical History:   Diagnosis Date   • BPH (benign prostatic hyperplasia)    • CAD (coronary artery disease)        Surgical History:   Past Surgical History:   Procedure Laterality Date   • CORONARY ANGIOPLASTY WITH STENT PLACEMENT  05/2016    x 3   • TRANSURETHRAL RESECTION OF PROSTATE  2013       Allergies: Fluconazole    Current Outpatient Medications   Medication Sig Dispense Refill   • aspirin 81 mg enteric coated tablet Take 81 mg by mouth daily.     • baclofen (LIORESAL) 10 mg tablet Take 1 tablet (10 mg total) by mouth 2 (two) times a day. 180 tablet 3   • cholecalciferol, vitamin D3, 1,000 unit (25 mcg) tablet Take 2,000 Units by mouth daily.     • clonazePAM (klonoPIN) 0.5 mg tablet TAKE ONE TABLET BY MOUTH EVERY DAY 90 tablet 1   • cranberry extract 425 mg capsule Take 425 mg by mouth daily.     • metoprolol tartrate (LOPRESSOR) 25 mg tablet Take 12.5 mg by mouth 2 (two) times a day.     • ocrelizumab (OCREVUS) 30 mg/mL solution Infuse 20 mL (600 mg total) into a  "venous catheter every 6 (six) months. 20 mL 1   • rosuvastatin (CRESTOR) 10 mg tablet Take 10 mg by mouth 3 (three) times a week (Mon, Wed, Fri).     • escitalopram (LEXAPRO) 10 mg tablet Take 10 mg by mouth daily.       No current facility-administered medications for this visit.         Family History: History reviewed. No pertinent family history.    Social History:   Social History     Socioeconomic History   • Marital status:      Spouse name: None   • Number of children: 2   • Years of education: None   • Highest education level: None   Occupational History   • Occupation: Retired   Tobacco Use   • Smoking status: Never   • Smokeless tobacco: Never   Substance and Sexual Activity   • Alcohol use: Yes     Alcohol/week: 1.0 standard drink of alcohol     Types: 1 Glasses of wine per week           Objective     Physical Exam  Visit Vitals  /80   Pulse 76   Ht 1.803 m (5' 11\") Comment: wheelchair   Wt 68.9 kg (152 lb)   SpO2 95%   BMI 21.20 kg/m²       Higher cortical function: Cognitively clear, language intact.  Cranial nerves: Mild VINCENT greater left gaze but not clearly with right gaze.  Otherwise cranial nerves II through XII normal.  Motor: 3-/5 bilateral hip flexor weakness, worse on the right.   Mild slowing of rapid movements  hands, right greater than left.  Mild outflow tremor with finger-to-nose.  Cerebellum: No upper extremity dysmetria.  Gait: In a wheelchair.  Stands up independently.  Ambulates with a spastic paraparetic gait my mild assistance including a right carbon fiber AFO brace  General: Awake, alert, in no apparent distress.  HEENT: Normocephalic atraumatic.  Eyes: Orbits benign, extraocular motions full.  Neck : Supple, no carotid bruit.    Lungs: Clear bilaterally.  Heart: S1 and S2 normal, regular rate and rhythm, no murmur.  Extremities: Right AFO brace  Musculoskeletal: No injury or deformity.  Psychiatric: Appropriate and cooperative  Assessment and Plan    Multiple " sclerosis (CMS/HCC)  The patient's walking is gradually declined over the years.  He has not had any relapses.  He will continue Ocrevus as disease modifying therapy; his next infusion is scheduled for September.  I have asked him to undergo updated MRI scans of the brain and cervical spine with and without contrast in order to compare to 2020.  He will also pursue physical therapies.  I will reassess him in October.    Adverse effect of drug/medicinal  Obtain surveillance blood work.    Dysphagia, oropharyngeal phase  Patient has occasional choking and swallowing problems.  I have asked him to meet with speech therapy.    Total time spent 36 minutes  Arun Meadows MD  2:24 PM

## 2023-07-11 NOTE — ASSESSMENT & PLAN NOTE
The patient's walking is gradually declined over the years.  He has not had any relapses.  He will continue Ocrevus as disease modifying therapy; his next infusion is scheduled for September.  I have asked him to undergo updated MRI scans of the brain and cervical spine with and without contrast in order to compare to 2020.  He will also pursue physical therapies.  I will reassess him in October.

## 2023-08-18 RX ORDER — BACLOFEN 10 MG/1
10 TABLET ORAL 2 TIMES DAILY
Qty: 180 TABLET | Refills: 3 | Status: SHIPPED | OUTPATIENT
Start: 2023-08-18 | End: 2023-08-24 | Stop reason: SDUPTHER

## 2023-08-24 RX ORDER — BACLOFEN 10 MG/1
10 TABLET ORAL 2 TIMES DAILY
Qty: 180 TABLET | Refills: 3 | Status: SHIPPED | OUTPATIENT
Start: 2023-08-24 | End: 2024-10-10 | Stop reason: SDUPTHER

## 2023-11-15 ENCOUNTER — HOSPITAL ENCOUNTER (OUTPATIENT)
Dept: RADIOLOGY | Age: 68
Discharge: HOME | End: 2023-11-15
Attending: PSYCHIATRY & NEUROLOGY
Payer: MEDICARE

## 2023-11-15 DIAGNOSIS — G35 MULTIPLE SCLEROSIS (CMS/HCC): ICD-10-CM

## 2023-11-15 RX ORDER — GADOBUTROL 604.72 MG/ML
6.5 INJECTION INTRAVENOUS ONCE
Status: COMPLETED | OUTPATIENT
Start: 2023-11-15 | End: 2023-11-15

## 2023-11-15 RX ADMIN — GADOBUTROL 6.5 ML: 604.72 INJECTION INTRAVENOUS at 10:58

## 2023-12-08 DIAGNOSIS — G35 MULTIPLE SCLEROSIS (CMS/HCC): ICD-10-CM

## 2023-12-08 RX ORDER — CLONAZEPAM 0.5 MG/1
0.5 TABLET ORAL DAILY
Qty: 90 TABLET | Refills: 1 | Status: SHIPPED | OUTPATIENT
Start: 2023-12-08 | End: 2024-03-18 | Stop reason: SDUPTHER

## 2023-12-11 DIAGNOSIS — G35 MULTIPLE SCLEROSIS (CMS/HCC): ICD-10-CM

## 2023-12-11 RX ORDER — CLONAZEPAM 0.5 MG/1
0.5 TABLET ORAL DAILY
Qty: 90 TABLET | Refills: 1 | Status: CANCELLED | OUTPATIENT
Start: 2023-12-11

## 2023-12-26 ENCOUNTER — HOSPITAL ENCOUNTER (OUTPATIENT)
Facility: HOSPITAL | Age: 68
Setting detail: OBSERVATION
Discharge: HOME HEALTH | End: 2023-12-28
Attending: EMERGENCY MEDICINE | Admitting: STUDENT IN AN ORGANIZED HEALTH CARE EDUCATION/TRAINING PROGRAM
Payer: MEDICARE

## 2023-12-26 DIAGNOSIS — G35 MULTIPLE SCLEROSIS (CMS/HCC): ICD-10-CM

## 2023-12-26 DIAGNOSIS — R50.9 FEVER, UNSPECIFIED FEVER CAUSE: ICD-10-CM

## 2023-12-26 DIAGNOSIS — J18.9 PNEUMONIA OF RIGHT LUNG DUE TO INFECTIOUS ORGANISM, UNSPECIFIED PART OF LUNG: Primary | ICD-10-CM

## 2023-12-26 LAB
ALBUMIN SERPL-MCNC: 3.4 G/DL (ref 3.5–5.7)
ALP SERPL-CCNC: 48 IU/L (ref 34–125)
ALT SERPL-CCNC: 11 IU/L (ref 7–52)
ANION GAP SERPL CALC-SCNC: 9 MEQ/L (ref 3–15)
AST SERPL-CCNC: 16 IU/L (ref 13–39)
BASOPHILS # BLD: 0.01 K/UL (ref 0.01–0.1)
BASOPHILS NFR BLD: 0.2 %
BILIRUB SERPL-MCNC: 0.5 MG/DL (ref 0.3–1.2)
BUN SERPL-MCNC: 17 MG/DL (ref 7–25)
CALCIUM SERPL-MCNC: 8.9 MG/DL (ref 8.6–10.3)
CHLORIDE SERPL-SCNC: 103 MEQ/L (ref 98–107)
CO2 SERPL-SCNC: 27 MEQ/L (ref 21–31)
CREAT SERPL-MCNC: 0.6 MG/DL (ref 0.7–1.3)
DIFFERENTIAL METHOD BLD: ABNORMAL
EGFRCR SERPLBLD CKD-EPI 2021: >60 ML/MIN/1.73M*2
EOSINOPHIL # BLD: 0.05 K/UL (ref 0.04–0.54)
EOSINOPHIL NFR BLD: 0.9 %
ERYTHROCYTE [DISTWIDTH] IN BLOOD BY AUTOMATED COUNT: 13.4 % (ref 11.6–14.4)
GLUCOSE SERPL-MCNC: 125 MG/DL (ref 70–99)
HCT VFR BLDCO AUTO: 38.9 % (ref 40.1–51)
HGB BLD-MCNC: 12.6 G/DL (ref 13.7–17.5)
IMM GRANULOCYTES # BLD AUTO: 0.04 K/UL (ref 0–0.08)
IMM GRANULOCYTES NFR BLD AUTO: 0.7 %
LYMPHOCYTES # BLD: 0.64 K/UL (ref 1.2–3.5)
LYMPHOCYTES NFR BLD: 11.1 %
MCH RBC QN AUTO: 29.2 PG (ref 28–33.2)
MCHC RBC AUTO-ENTMCNC: 32.4 G/DL (ref 32.2–36.5)
MCV RBC AUTO: 90.3 FL (ref 83–98)
MONOCYTES # BLD: 0.37 K/UL (ref 0.3–1)
MONOCYTES NFR BLD: 6.4 %
NEUTROPHILS # BLD: 4.64 K/UL (ref 1.7–7)
NEUTS SEG NFR BLD: 80.7 %
NRBC BLD-RTO: 0 %
PDW BLD AUTO: 10.9 FL (ref 9.4–12.4)
PLATELET # BLD AUTO: 218 K/UL (ref 150–350)
POTASSIUM SERPL-SCNC: 3.9 MEQ/L (ref 3.5–5.1)
PROT SERPL-MCNC: 6.4 G/DL (ref 6–8.2)
RBC # BLD AUTO: 4.31 M/UL (ref 4.5–5.8)
SODIUM SERPL-SCNC: 139 MEQ/L (ref 136–145)
TROPONIN I SERPL HS-MCNC: 4.2 PG/ML
TROPONIN I SERPL HS-MCNC: 4.7 PG/ML
WBC # BLD AUTO: 5.75 K/UL (ref 3.8–10.5)

## 2023-12-26 PROCEDURE — 84484 ASSAY OF TROPONIN QUANT: CPT | Mod: 91

## 2023-12-26 PROCEDURE — 86618 LYME DISEASE ANTIBODY: CPT | Performed by: EMERGENCY MEDICINE

## 2023-12-26 PROCEDURE — 99285 EMERGENCY DEPT VISIT HI MDM: CPT | Mod: 25

## 2023-12-26 PROCEDURE — 93005 ELECTROCARDIOGRAM TRACING: CPT | Performed by: EMERGENCY MEDICINE

## 2023-12-26 PROCEDURE — 87468 ANAPLSMA PHGCYTOPHLM AMP PRB: CPT | Performed by: EMERGENCY MEDICINE

## 2023-12-26 PROCEDURE — 82784 ASSAY IGA/IGD/IGG/IGM EACH: CPT | Mod: 59 | Performed by: INTERNAL MEDICINE

## 2023-12-26 PROCEDURE — 85025 COMPLETE CBC W/AUTO DIFF WBC: CPT

## 2023-12-26 PROCEDURE — 36415 COLL VENOUS BLD VENIPUNCTURE: CPT

## 2023-12-26 PROCEDURE — 93005 ELECTROCARDIOGRAM TRACING: CPT

## 2023-12-26 PROCEDURE — 84484 ASSAY OF TROPONIN QUANT: CPT | Mod: 91,59 | Performed by: EMERGENCY MEDICINE

## 2023-12-26 PROCEDURE — 80053 COMPREHEN METABOLIC PANEL: CPT

## 2023-12-26 PROCEDURE — 84484 ASSAY OF TROPONIN QUANT: CPT | Mod: 91 | Performed by: EMERGENCY MEDICINE

## 2023-12-26 PROCEDURE — 87040 BLOOD CULTURE FOR BACTERIA: CPT | Performed by: EMERGENCY MEDICINE

## 2023-12-26 PROCEDURE — 80053 COMPREHEN METABOLIC PANEL: CPT | Performed by: EMERGENCY MEDICINE

## 2023-12-26 PROCEDURE — 96372 THER/PROPH/DIAG INJ SC/IM: CPT | Mod: 59

## 2023-12-26 PROCEDURE — 96361 HYDRATE IV INFUSION ADD-ON: CPT

## 2023-12-26 PROCEDURE — 96360 HYDRATION IV INFUSION INIT: CPT | Mod: 59

## 2023-12-26 PROCEDURE — 84484 ASSAY OF TROPONIN QUANT: CPT

## 2023-12-26 PROCEDURE — 85025 COMPLETE CBC W/AUTO DIFF WBC: CPT | Performed by: EMERGENCY MEDICINE

## 2023-12-26 RX ORDER — DOXYCYCLINE 100 MG/1
TABLET ORAL
COMMUNITY
Start: 2023-12-23 | End: 2023-12-27 | Stop reason: ENTERED-IN-ERROR

## 2023-12-26 RX ORDER — AMOXICILLIN AND CLAVULANATE POTASSIUM 875; 125 MG/1; MG/1
1 TABLET, FILM COATED ORAL 2 TIMES DAILY WITH MEALS
COMMUNITY
Start: 2023-12-20 | End: 2023-12-27 | Stop reason: ENTERED-IN-ERROR

## 2023-12-27 ENCOUNTER — APPOINTMENT (EMERGENCY)
Dept: RADIOLOGY | Facility: HOSPITAL | Age: 68
End: 2023-12-27
Attending: EMERGENCY MEDICINE
Payer: MEDICARE

## 2023-12-27 ENCOUNTER — APPOINTMENT (OUTPATIENT)
Dept: RADIOLOGY | Facility: HOSPITAL | Age: 68
Setting detail: OBSERVATION
End: 2023-12-27
Attending: STUDENT IN AN ORGANIZED HEALTH CARE EDUCATION/TRAINING PROGRAM
Payer: MEDICARE

## 2023-12-27 PROBLEM — R91.8: Status: ACTIVE | Noted: 2023-12-27

## 2023-12-27 PROBLEM — R19.7 DIARRHEA: Status: ACTIVE | Noted: 2023-12-27

## 2023-12-27 PROBLEM — R50.9 FEVER, UNKNOWN ORIGIN: Status: ACTIVE | Noted: 2023-12-27

## 2023-12-27 LAB
ANION GAP SERPL CALC-SCNC: 7 MEQ/L (ref 3–15)
ATRIAL RATE: 79
B BURGDOR AB SER IA-ACNC: 0.15 RATIO
BACTERIA URNS QL MICRO: ABNORMAL /HPF
BASOPHILS # BLD: 0.01 K/UL (ref 0.01–0.1)
BASOPHILS NFR BLD: 0.3 %
BILIRUB UR QL STRIP.AUTO: NEGATIVE MG/DL
BUN SERPL-MCNC: 15 MG/DL (ref 7–25)
C DIFF TOX GENS STL QL NAA+PROBE: NEGATIVE
CALCIUM SERPL-MCNC: 8 MG/DL (ref 8.6–10.3)
CHLORIDE SERPL-SCNC: 105 MEQ/L (ref 98–107)
CLARITY UR REFRACT.AUTO: CLEAR
CO2 SERPL-SCNC: 27 MEQ/L (ref 21–31)
COLOR UR AUTO: YELLOW
CREAT SERPL-MCNC: 0.5 MG/DL (ref 0.7–1.3)
DIFFERENTIAL METHOD BLD: ABNORMAL
EGFRCR SERPLBLD CKD-EPI 2021: >60 ML/MIN/1.73M*2
EOSINOPHIL # BLD: 0.02 K/UL (ref 0.04–0.54)
EOSINOPHIL NFR BLD: 0.5 %
ERYTHROCYTE [DISTWIDTH] IN BLOOD BY AUTOMATED COUNT: 13.6 % (ref 11.6–14.4)
FLUAV RNA SPEC QL NAA+PROBE: NEGATIVE
FLUBV RNA SPEC QL NAA+PROBE: NEGATIVE
GIANT PLATELETS BLD QL SMEAR: ABNORMAL
GLUCOSE SERPL-MCNC: 88 MG/DL (ref 70–99)
GLUCOSE UR STRIP.AUTO-MCNC: NEGATIVE MG/DL
HCT VFR BLDCO AUTO: 31.9 % (ref 40.1–51)
HGB BLD-MCNC: 10.3 G/DL (ref 13.7–17.5)
HGB UR QL STRIP.AUTO: ABNORMAL
HYALINE CASTS #/AREA URNS LPF: ABNORMAL /LPF
IMM GRANULOCYTES # BLD AUTO: 0.03 K/UL (ref 0–0.08)
IMM GRANULOCYTES NFR BLD AUTO: 0.8 %
KETONES UR STRIP.AUTO-MCNC: 1 MG/DL
LABORATORY COMMENT REPORT: NORMAL
LACTATE SERPL-SCNC: 1.6 MMOL/L (ref 0.4–2)
LEUKOCYTE ESTERASE UR QL STRIP.AUTO: NEGATIVE
LYMPHOCYTES # BLD: 0.99 K/UL (ref 1.2–3.5)
LYMPHOCYTES NFR BLD: 26.8 %
MAGNESIUM SERPL-MCNC: 1.8 MG/DL (ref 1.8–2.5)
MCH RBC QN AUTO: 29.3 PG (ref 28–33.2)
MCHC RBC AUTO-ENTMCNC: 32.3 G/DL (ref 32.2–36.5)
MCV RBC AUTO: 90.6 FL (ref 83–98)
MONOCYTES # BLD: 0.37 K/UL (ref 0.3–1)
MONOCYTES NFR BLD: 10 %
MUCOUS THREADS URNS QL MICRO: ABNORMAL /LPF
NEUTROPHILS # BLD: 2.27 K/UL (ref 1.7–7)
NEUTS SEG NFR BLD: 61.6 %
NITRITE UR QL STRIP.AUTO: NEGATIVE
NRBC BLD-RTO: 0 %
P AXIS: 84
PARASITE BLD: NORMAL
PATH REV: NORMAL
PDW BLD AUTO: 11.1 FL (ref 9.4–12.4)
PH UR STRIP.AUTO: 6 [PH]
PLAT MORPH BLD: NORMAL
PLATELET # BLD AUTO: 158 K/UL (ref 150–350)
PLATELET # BLD EST: ABNORMAL 10*3/UL
PLATELET CLUMP BLD QL SMEAR: PRESENT
POTASSIUM SERPL-SCNC: 3.4 MEQ/L (ref 3.5–5.1)
PR INTERVAL: 144
PROT UR QL STRIP.AUTO: 1
QRS DURATION: 80
QT INTERVAL: 382
QTC CALCULATION(BAZETT): 438
R AXIS: -54
RBC # BLD AUTO: 3.52 M/UL (ref 4.5–5.8)
RBC #/AREA URNS HPF: ABNORMAL /HPF
RSV RNA SPEC QL NAA+PROBE: NEGATIVE
SARS-COV-2 RNA RESP QL NAA+PROBE: NEGATIVE
SODIUM SERPL-SCNC: 139 MEQ/L (ref 136–145)
SP GR UR REFRACT.AUTO: 1.03
SQUAMOUS URNS QL MICRO: ABNORMAL /HPF
T WAVE AXIS: 80
UROBILINOGEN UR STRIP-ACNC: 2 EU/DL
VENTRICULAR RATE: 79
WBC # BLD AUTO: 3.69 K/UL (ref 3.8–10.5)
WBC #/AREA URNS HPF: ABNORMAL /HPF
WBC STL QL MB STN: NORMAL

## 2023-12-27 PROCEDURE — 63700000 HC SELF-ADMINISTRABLE DRUG: Performed by: STUDENT IN AN ORGANIZED HEALTH CARE EDUCATION/TRAINING PROGRAM

## 2023-12-27 PROCEDURE — 87637 SARSCOV2&INF A&B&RSV AMP PRB: CPT | Performed by: EMERGENCY MEDICINE

## 2023-12-27 PROCEDURE — 63600105 HC IODINE BASED CONTRAST: Mod: JZ | Performed by: STUDENT IN AN ORGANIZED HEALTH CARE EDUCATION/TRAINING PROGRAM

## 2023-12-27 PROCEDURE — G1004 CDSM NDSC: HCPCS

## 2023-12-27 PROCEDURE — 83993 ASSAY FOR CALPROTECTIN FECAL: CPT | Performed by: STUDENT IN AN ORGANIZED HEALTH CARE EDUCATION/TRAINING PROGRAM

## 2023-12-27 PROCEDURE — G0378 HOSPITAL OBSERVATION PER HR: HCPCS

## 2023-12-27 PROCEDURE — 81003 URINALYSIS AUTO W/O SCOPE: CPT | Performed by: EMERGENCY MEDICINE

## 2023-12-27 PROCEDURE — 87177 OVA AND PARASITES SMEARS: CPT | Mod: 59 | Performed by: STUDENT IN AN ORGANIZED HEALTH CARE EDUCATION/TRAINING PROGRAM

## 2023-12-27 PROCEDURE — 99222 1ST HOSP IP/OBS MODERATE 55: CPT | Performed by: STUDENT IN AN ORGANIZED HEALTH CARE EDUCATION/TRAINING PROGRAM

## 2023-12-27 PROCEDURE — 36415 COLL VENOUS BLD VENIPUNCTURE: CPT | Performed by: EMERGENCY MEDICINE

## 2023-12-27 PROCEDURE — 87046 STOOL CULTR AEROBIC BACT EA: CPT | Mod: 59 | Performed by: STUDENT IN AN ORGANIZED HEALTH CARE EDUCATION/TRAINING PROGRAM

## 2023-12-27 PROCEDURE — 89055 LEUKOCYTE ASSESSMENT FECAL: CPT | Performed by: STUDENT IN AN ORGANIZED HEALTH CARE EDUCATION/TRAINING PROGRAM

## 2023-12-27 PROCEDURE — 71045 X-RAY EXAM CHEST 1 VIEW: CPT

## 2023-12-27 PROCEDURE — 83735 ASSAY OF MAGNESIUM: CPT | Performed by: STUDENT IN AN ORGANIZED HEALTH CARE EDUCATION/TRAINING PROGRAM

## 2023-12-27 PROCEDURE — 200200 PR NO CHARGE: Performed by: STUDENT IN AN ORGANIZED HEALTH CARE EDUCATION/TRAINING PROGRAM

## 2023-12-27 PROCEDURE — 63700000 HC SELF-ADMINISTRABLE DRUG: Performed by: INTERNAL MEDICINE

## 2023-12-27 PROCEDURE — 80048 BASIC METABOLIC PNL TOTAL CA: CPT | Performed by: STUDENT IN AN ORGANIZED HEALTH CARE EDUCATION/TRAINING PROGRAM

## 2023-12-27 PROCEDURE — 87015 SPECIMEN INFECT AGNT CONCNTJ: CPT | Performed by: EMERGENCY MEDICINE

## 2023-12-27 PROCEDURE — 83605 ASSAY OF LACTIC ACID: CPT | Performed by: EMERGENCY MEDICINE

## 2023-12-27 PROCEDURE — 87493 C DIFF AMPLIFIED PROBE: CPT | Performed by: STUDENT IN AN ORGANIZED HEALTH CARE EDUCATION/TRAINING PROGRAM

## 2023-12-27 PROCEDURE — 87040 BLOOD CULTURE FOR BACTERIA: CPT | Performed by: EMERGENCY MEDICINE

## 2023-12-27 PROCEDURE — 63700000 HC SELF-ADMINISTRABLE DRUG: Performed by: PHYSICIAN ASSISTANT

## 2023-12-27 PROCEDURE — 85025 COMPLETE CBC W/AUTO DIFF WBC: CPT | Performed by: STUDENT IN AN ORGANIZED HEALTH CARE EDUCATION/TRAINING PROGRAM

## 2023-12-27 PROCEDURE — 63600000 HC DRUGS/DETAIL CODE: Mod: JZ | Performed by: STUDENT IN AN ORGANIZED HEALTH CARE EDUCATION/TRAINING PROGRAM

## 2023-12-27 PROCEDURE — 25800000 HC PHARMACY IV SOLUTIONS: Performed by: PHYSICIAN ASSISTANT

## 2023-12-27 RX ORDER — ENOXAPARIN SODIUM 100 MG/ML
40 INJECTION SUBCUTANEOUS
Status: DISCONTINUED | OUTPATIENT
Start: 2023-12-27 | End: 2023-12-28 | Stop reason: HOSPADM

## 2023-12-27 RX ORDER — IBUPROFEN 200 MG
16-32 TABLET ORAL AS NEEDED
Status: DISCONTINUED | OUTPATIENT
Start: 2023-12-27 | End: 2023-12-28 | Stop reason: HOSPADM

## 2023-12-27 RX ORDER — BACLOFEN 10 MG/1
10 TABLET ORAL 2 TIMES DAILY
Status: DISCONTINUED | OUTPATIENT
Start: 2023-12-27 | End: 2023-12-28 | Stop reason: HOSPADM

## 2023-12-27 RX ORDER — POTASSIUM CHLORIDE 14.9 MG/ML
20 INJECTION INTRAVENOUS AS NEEDED
Status: DISCONTINUED | OUTPATIENT
Start: 2023-12-27 | End: 2023-12-28 | Stop reason: HOSPADM

## 2023-12-27 RX ORDER — POTASSIUM CHLORIDE 750 MG/1
40 TABLET, EXTENDED RELEASE ORAL AS NEEDED
Status: DISCONTINUED | OUTPATIENT
Start: 2023-12-27 | End: 2023-12-28 | Stop reason: HOSPADM

## 2023-12-27 RX ORDER — POTASSIUM CHLORIDE 750 MG/1
20 TABLET, EXTENDED RELEASE ORAL AS NEEDED
Status: DISCONTINUED | OUTPATIENT
Start: 2023-12-27 | End: 2023-12-28 | Stop reason: HOSPADM

## 2023-12-27 RX ORDER — ROSUVASTATIN CALCIUM 10 MG/1
10 TABLET, COATED ORAL 3 TIMES WEEKLY
Status: DISCONTINUED | OUTPATIENT
Start: 2023-12-27 | End: 2023-12-28 | Stop reason: HOSPADM

## 2023-12-27 RX ORDER — IOPAMIDOL 755 MG/ML
100 INJECTION, SOLUTION INTRAVASCULAR
Status: COMPLETED | OUTPATIENT
Start: 2023-12-27 | End: 2023-12-27

## 2023-12-27 RX ORDER — ACETAMINOPHEN 325 MG/1
650 TABLET ORAL ONCE
Status: COMPLETED | OUTPATIENT
Start: 2023-12-27 | End: 2023-12-27

## 2023-12-27 RX ORDER — CHOLECALCIFEROL (VITAMIN D3) 25 MCG
2000 TABLET ORAL DAILY
Status: DISCONTINUED | OUTPATIENT
Start: 2023-12-27 | End: 2023-12-28 | Stop reason: HOSPADM

## 2023-12-27 RX ORDER — CLONAZEPAM 0.5 MG/1
0.5 TABLET ORAL DAILY
Status: DISCONTINUED | OUTPATIENT
Start: 2023-12-27 | End: 2023-12-28

## 2023-12-27 RX ORDER — DEXTROSE 50 % IN WATER (D50W) INTRAVENOUS SYRINGE
25 AS NEEDED
Status: DISCONTINUED | OUTPATIENT
Start: 2023-12-27 | End: 2023-12-28 | Stop reason: HOSPADM

## 2023-12-27 RX ORDER — CHOLESTYRAMINE 4 G/9G
1 POWDER, FOR SUSPENSION ORAL DAILY
Status: DISCONTINUED | OUTPATIENT
Start: 2023-12-27 | End: 2023-12-28 | Stop reason: HOSPADM

## 2023-12-27 RX ORDER — ACETAMINOPHEN 325 MG/1
650 TABLET ORAL EVERY 4 HOURS PRN
Status: DISCONTINUED | OUTPATIENT
Start: 2023-12-27 | End: 2023-12-28 | Stop reason: HOSPADM

## 2023-12-27 RX ORDER — ASPIRIN 81 MG/1
81 TABLET ORAL DAILY
Status: DISCONTINUED | OUTPATIENT
Start: 2023-12-27 | End: 2023-12-28 | Stop reason: HOSPADM

## 2023-12-27 RX ORDER — DEXTROSE 40 %
15-30 GEL (GRAM) ORAL AS NEEDED
Status: DISCONTINUED | OUTPATIENT
Start: 2023-12-27 | End: 2023-12-28 | Stop reason: HOSPADM

## 2023-12-27 RX ORDER — ESCITALOPRAM OXALATE 10 MG/1
10 TABLET ORAL DAILY
Status: DISCONTINUED | OUTPATIENT
Start: 2023-12-27 | End: 2023-12-28 | Stop reason: HOSPADM

## 2023-12-27 RX ADMIN — ACETAMINOPHEN 650 MG: 325 TABLET, FILM COATED ORAL at 00:34

## 2023-12-27 RX ADMIN — ACETAMINOPHEN 650 MG: 325 TABLET, FILM COATED ORAL at 19:55

## 2023-12-27 RX ADMIN — METOPROLOL TARTRATE 12.5 MG: 25 TABLET, FILM COATED ORAL at 08:33

## 2023-12-27 RX ADMIN — ENOXAPARIN SODIUM 40 MG: 40 INJECTION SUBCUTANEOUS at 18:11

## 2023-12-27 RX ADMIN — CLONAZEPAM 0.5 MG: 0.5 TABLET ORAL at 19:54

## 2023-12-27 RX ADMIN — METOPROLOL TARTRATE 12.5 MG: 25 TABLET, FILM COATED ORAL at 19:52

## 2023-12-27 RX ADMIN — BACLOFEN 10 MG: 10 TABLET ORAL at 08:33

## 2023-12-27 RX ADMIN — ESCITALOPRAM OXALATE 10 MG: 10 TABLET ORAL at 08:33

## 2023-12-27 RX ADMIN — BACLOFEN 10 MG: 10 TABLET ORAL at 19:52

## 2023-12-27 RX ADMIN — CHOLESTYRAMINE 1 PACKET: 4 POWDER, FOR SUSPENSION ORAL at 20:27

## 2023-12-27 RX ADMIN — ASPIRIN 81 MG: 81 TABLET, COATED ORAL at 08:34

## 2023-12-27 RX ADMIN — Medication 2000 UNITS: at 08:34

## 2023-12-27 RX ADMIN — POTASSIUM CHLORIDE 40 MEQ: 750 TABLET, EXTENDED RELEASE ORAL at 11:21

## 2023-12-27 RX ADMIN — IOPAMIDOL 100 ML: 755 INJECTION, SOLUTION INTRAVENOUS at 04:00

## 2023-12-27 RX ADMIN — SODIUM CHLORIDE 1000 ML: 9 INJECTION, SOLUTION INTRAVENOUS at 00:33

## 2023-12-27 ASSESSMENT — ENCOUNTER SYMPTOMS
DYSURIA: 0
HEADACHES: 0
FEVER: 1
ABDOMINAL PAIN: 0
DIARRHEA: 1
WEAKNESS: 0
NECK PAIN: 0
FLANK PAIN: 0
SHORTNESS OF BREATH: 0
ACTIVITY CHANGE: 0
MYALGIAS: 0
COLOR CHANGE: 0
UNEXPECTED WEIGHT CHANGE: 1
VOMITING: 0
DIFFICULTY URINATING: 0
HEMATURIA: 0
CONFUSION: 0
AGITATION: 0
SORE THROAT: 0
ARTHRALGIAS: 0
BACK PAIN: 0
NAUSEA: 0
COUGH: 0
SEIZURES: 0
WOUND: 1
RHINORRHEA: 0
SPEECH DIFFICULTY: 0
CHILLS: 0

## 2023-12-27 ASSESSMENT — COGNITIVE AND FUNCTIONAL STATUS - GENERAL
MOVING TO AND FROM BED TO CHAIR: 3 - A LITTLE
STANDING UP FROM CHAIR USING ARMS: 3 - A LITTLE
WALKING IN HOSPITAL ROOM: 3 - A LITTLE
CLIMB 3 TO 5 STEPS WITH RAILING: 3 - A LITTLE

## 2023-12-27 NOTE — H&P
Hospital Medicine Service -  History & Physical        CHIEF COMPLAINT   Chief Complaint   Patient presents with   • Fever   • Weakness - Generalized          HISTORY OF PRESENT ILLNESS      Virgil Edge is a 68 y.o. male with a past medical history of CAD, MS, BPH, chronic indwelling Diana who presents with fever, diarrhea, admitted for fever    Patient presents with fever. Patient states he was recently diagnosed with COVID on 10/28, and was prescribed Paxlovid at the time. He only took 2 days of Paxlovid and stopped due to developing severe diarrhea. He continued to have diarrhea daily, and was evaluated by his GI doctor who ordered a broad workup which has been unrevealing. He was taking imodium without much relief. Due to the diarrhea he has decreased his oral intake and lost 10 lbs. He did not bloody output which he believes were due to hemorrhoids. In the last 5 days or so, he started having daily fevers multiple times throughout the day. He was concerned about possible UTI and was started on Augmentin by his PCP. Due to ongoing fever, he was evaluated at [facility] ED, and was started on doxycycline as well for possible pneumonia. When he continued to have fevers despite antibiotics, he presented to  ED for evaluation. He denies any known sick contacts, recent travels, tick bites. He does have chronic lower extremity weakness due to MS and requires a walker for ambulation. Patient denies cough, shortness of breath, chest pain, palpitations, nausea, vomiting, abdominal pain, constipation, dysuria, hematuria, leg swelling.      ED course:  Temp 100.6. Hgb 12.6. UA negative for bacteria. CT abdomen showing no significant abnormalities except right middle lobe infiltrate.             PAST MEDICAL AND SURGICAL HISTORY      Past Medical History:   Diagnosis Date   • BPH (benign prostatic hyperplasia)    • CAD (coronary artery disease)    • FHx: multiple sclerosis        Past Surgical History:    Procedure Laterality Date   • CORONARY ANGIOPLASTY WITH STENT PLACEMENT  05/2016    x 3   • TRANSURETHRAL RESECTION OF PROSTATE  2013       PCP: Krunal Alcala, DO    MEDICATIONS      Prior to Admission medications    Medication Sig Start Date End Date Taking? Authorizing Provider   amoxicillin-pot clavulanate (AUGMENTIN) 875-125 mg per tablet Take 1 tablet by mouth 2 (two) times a day with meals. 12/20/23   Axel Thurston MD   aspirin 81 mg enteric coated tablet Take 81 mg by mouth daily.    Geneva Thurston MD   baclofen (LIORESAL) 10 mg tablet Take 1 tablet (10 mg total) by mouth 2 (two) times a day. 8/24/23 2/20/24  Arun Meadows MD   cholecalciferol, vitamin D3, 1,000 unit (25 mcg) tablet Take 2,000 Units by mouth daily.    Geneva Thurston MD   clonazePAM (klonoPIN) 0.5 mg tablet Take 1 tablet (0.5 mg total) by mouth daily. 12/8/23   Arun Meadows MD   cranberry extract 425 mg capsule Take 425 mg by mouth daily.    Geneva Thurston MD   doxycycline monohydrate (ADOXA) 100 mg tablet TAKE ONE TABLET BY MOUTH EVERY 12 HOURS FOR 7 DAYS 12/23/23   Axel Thurston MD   escitalopram (LEXAPRO) 10 mg tablet Take 10 mg by mouth daily. 5/8/23   Axel Thurston MD   metoprolol tartrate (LOPRESSOR) 25 mg tablet Take 12.5 mg by mouth 2 (two) times a day. 9/4/20   Geneva Thurston MD   ocrelizumab (OCREVUS) 30 mg/mL solution Infuse 20 mL (600 mg total) into a venous catheter every 6 (six) months. 3/6/23   Arun Meadows MD   rosuvastatin (CRESTOR) 10 mg tablet Take 10 mg by mouth 3 (three) times a week (Mon, Wed, Fri).    Geneva Thurston MD       ALLERGIES      Fluconazole    FAMILY HISTORY      History reviewed. No pertinent family history.    SOCIAL HISTORY      Social History     Socioeconomic History   • Marital status:      Spouse name: None   • Number of children: 2   • Years of education: None   • Highest education level: None    Occupational History   • Occupation: Retired   Tobacco Use   • Smoking status: Never   • Smokeless tobacco: Never   Substance and Sexual Activity   • Alcohol use: Not Currently     Alcohol/week: 1.0 standard drink of alcohol     Types: 1 Glasses of wine per week   • Drug use: Never     Social Determinants of Health     Food Insecurity: No Food Insecurity (12/26/2023)    Hunger Vital Sign    • Worried About Running Out of Food in the Last Year: Never true    • Ran Out of Food in the Last Year: Never true       REVIEW OF SYSTEMS        Review of Systems   (Positive findings marked bold)    General ROS: chills, fatigue or fever  Eyes ROS: vision changes, eye pain  ENT ROS: sore throat, dysphagia, rhinorrhea  Respiratory ROS: shortness of breath, cough, increased sputum  Cardiovascular ROS:  chest pain, edema, orthopnea palpitations  Gastrointestinal ROS: abdominal pain, gas/bloating, hematemesis, melena, nausea, vomiting, diarrhea  Genito-Urinary ROS: dysuria, trouble voiding, hematuria  Musculoskeletal ROS:  Lower extremity edema  Neurological ROS: confusion, headaches  Dermatological ROS: rash, itch, bruising  Psychiatric ROS: Suicidal ideation, homicidal ideation, hallucinations, depressed mood, anxiety    PHYSICAL EXAMINATION      Temp:  [36.2 °C (97.1 °F)-38.3 °C (101 °F)] 36.2 °C (97.1 °F)  Heart Rate:  [64-79] 65  Resp:  [16-18] 16  BP: (117-148)/(68-90) 122/68  Body mass index is 19.37 kg/m².    Physical Exam  Vitals reviewed.   Constitutional:       General: He is not in acute distress.     Appearance: Normal appearance. He is not ill-appearing.   HENT:      Head: Atraumatic.      Right Ear: External ear normal.      Left Ear: External ear normal.      Nose: No rhinorrhea.   Eyes:      General:         Right eye: No discharge.         Left eye: No discharge.      Extraocular Movements: Extraocular movements intact.   Cardiovascular:      Rate and Rhythm: Normal rate and regular rhythm.      Heart sounds:  Normal heart sounds.   Pulmonary:      Effort: Pulmonary effort is normal. No respiratory distress.      Breath sounds: Normal breath sounds. No wheezing.   Abdominal:      General: Abdomen is flat. There is no distension.      Palpations: Abdomen is soft.      Tenderness: There is no abdominal tenderness.   Musculoskeletal:      Cervical back: Normal range of motion and neck supple.      Right lower leg: No edema.      Left lower leg: No edema.   Skin:     General: Skin is warm and dry.   Neurological:      General: No focal deficit present.      Mental Status: He is alert and oriented to person, place, and time.   Psychiatric:         Mood and Affect: Mood normal.         LABS / IMAGING / EKG        Labs    Lab Results   Component Value Date     12/27/2023    K 3.4 (L) 12/27/2023     12/27/2023    CO2 27 12/27/2023    BUN 15 12/27/2023    CREATININE 0.5 (L) 12/27/2023    ALBUMIN 3.4 (L) 12/26/2023    ALT 11 12/26/2023    AST 16 12/26/2023    CALCIUM 8.0 (L) 12/27/2023    WBC 3.69 (L) 12/27/2023    HGB 10.3 (L) 12/27/2023    HCT 31.9 (L) 12/27/2023     12/27/2023    MG 1.8 12/27/2023    LACTATE 1.6 12/27/2023       CBC Results       12/27/23 12/26/23 10/05/20     0613 1847 1002    WBC 3.69 5.75 7.2    RBC 3.52 4.31 4.99    HGB 10.3 12.6 15.4    HCT 31.9 38.9 46.1    MCV 90.6 90.3 92    MCH 29.3 29.2 30.9    MCHC 32.3 32.4 33.4     218 176         Comment for HGB at 0613 on 12/27/23: RESULT CHECKED        CMP Results       12/27/23 12/26/23 10/05/20     0613 1847 1002     139 145    K 3.4 3.9 4.0    Cl 105 103 105    CO2 27 27 29    Glucose 88 125 103    BUN 15 17 13    Creatinine 0.5 0.6 0.87    Calcium 8.0 8.9 --    Anion Gap 7 9 --    AST -- 16 24    ALT -- 11 32    Albumin -- 3.4 4.5    EGFR >60.0 >60.0 89       103         Comment for K at 0613 on 12/27/23: Results obtained on plasma. Plasma Potassium values may be up to 0.4 mEQ/L less than serum values. The differences may be  greater for patients with high platelet or white cell counts.    Comment for K at 1847 on 12/26/23: Results obtained on plasma. Plasma Potassium values may be up to 0.4 mEQ/L less than serum values. The differences may be greater for patients with high platelet or white cell counts.    Comment for EGFR at 0613 on 12/27/23: Calculation based on the Chronic Kidney Disease Epidemiology Collaboration (CKD-EPI) equation refit without adjustment for race.    Comment for EGFR at 1847 on 12/26/23: Calculation based on the Chronic Kidney Disease Epidemiology Collaboration (CKD-EPI) equation refit without adjustment for race.          Troponin I Results       12/26/23 12/26/23 05/16/16 2055 1847 0405    Troponin I -- -- 4.40    HS Troponin I 4.7 4.2 --         Comment for Troponin I at 0405 on 05/16/16: A rise or fall of troponin with at least one abnormal value is consistent with myocardial injury or necrosis in the presence of appropriate clinical, ECG, and/or imaging abnormalities.  CONSISTENT WITH PREVIOUS RESULT            Imaging  CT abdomen  --  IMPRESSION: There are nondilated fluid-filled bowel loops compatible with  history of constipation.  Otherwise no acute abdominal/pelvic pathology  appreciated.     Incompletely imaged right middle lobe infiltrate.                                 SARS-CoV-2 (COVID-19) (no units)   Date/Time Value   12/27/2023 0037 Negative       ECG/Telemetry  EKG pending    ASSESSMENT AND PLAN           Infiltrate of middle lobe of right lung present on imaging study  Assessment & Plan  · S/p doxycycline, augmentin as outpt  · Given lack of respiratory symptoms, will monitor off antibiotics for now      Diarrhea  Assessment & Plan  > Ongoing diarrhea with fever, intermittent bloody output  > Possible IBD vs other  · GI consult  · C diff screen, stool culture, stool O&P  · Stool leukocytes, fecal calprotectin    Neurogenic bladder  Assessment & Plan  · Chronic jamison catheter in place    *  Fever, unknown origin  Assessment & Plan  > Unclear etiology, possible IBD vs intra-abdominal infection vs other  · Will monitor off antibiotics for now  · F/u Lyme studies, blood cultures  · GI consult       VTE Assessment: Padua VTE Score: 5  VTE Prophylaxis: Current anticoagulants:  enoxaparin (LOVENOX) syringe 40 mg, subcutaneous, Daily (6p)      Code Status: Full Code      Discussed advanced care planning.  Estimated Discharge Date: 12/27/2023  Disposition Planning: TBD     I spent a total of 55 minutes providing patient care; including history taking, physical examination, reviewing and interpreting data, reviewing prior notes, placing orders, discussing with consultants and documenting.       This note is signed by the night admitting physician. Please contact this author for any issues or questions between 7PM-7AM (Secure chat preferred).  For any issues/questions after 7AM, please contact day time [facility] physician.     Cody Raphael MD  Hospitalist/[facility] Medicine Service  Pager: [phone] (Secure chat preferred)  12/27/23

## 2023-12-27 NOTE — ASSESSMENT & PLAN NOTE
> Unclear etiology, possible IBD vs intra-abdominal infection vs other  · Will monitor off antibiotics for now  · C. difficile negative, ova and parasite negative.  No white blood cell in stool.  Blood cultures negative x 18 to 24 hours.  Fever resolving.

## 2023-12-27 NOTE — ASSESSMENT & PLAN NOTE
> Ongoing diarrhea with fever, intermittent bloody output  > Possible IBD vs other  · GI consult  · Recommend outpatient follow-up for consideration for colonoscopy

## 2023-12-27 NOTE — PROGRESS NOTES
Hospital Medicine Service -  Daily Progress Note       SUBJECTIVE   Interval History: Patient seen and examined at bedside.  States he is feeling well, no BM today or yesterday.  Last BM was on Monday, diarrhea.  Recently was treated with pneumonia with antibiotics.  Feeling tired overall.     OBJECTIVE      Vital signs in last 24 hours:  Temp:  [36.2 °C (97.1 °F)-38.3 °C (101 °F)] 37.1 °C (98.7 °F)  Heart Rate:  [62-97] 97  Resp:  [16-18] 16  BP: (117-150)/(60-90) 129/60    Intake/Output Summary (Last 24 hours) at 12/27/2023 1347  Last data filed at 12/27/2023 0243  Gross per 24 hour   Intake 1000 ml   Output --   Net 1000 ml       PHYSICAL EXAMINATION      General: no acute distress, awake and alert  HEENT: extraocular movements intact  Cardio: regular rate and rhythm  Pulm: clear to auscultation bilaterally, no wheezing  Abdominal: soft, nontender, nondistended  MSK: grossly intact, ROM intact, no edema  Skin: normal color and turgor  Neuro: Awake and alert, oriented x3, no focal deficits   LINES, CATHETERS, DRAINS, AIRWAYS, AND WOUNDS   Lines, Drains, and Airways:  Wounds (agree with documentation and present on admission):  Peripheral IV (Adult) 12/26/23 Left Wrist (Active)   Number of days: 1           LABS / IMAGING / TELE      Labs  Lab Results   Component Value Date    WBC 3.69 (L) 12/27/2023    HGB 10.3 (L) 12/27/2023    HCT 31.9 (L) 12/27/2023    MCV 90.6 12/27/2023     12/27/2023      Lab Results   Component Value Date    GLUCOSE 88 12/27/2023    CALCIUM 8.0 (L) 12/27/2023     12/27/2023    K 3.4 (L) 12/27/2023    CO2 27 12/27/2023     12/27/2023    BUN 15 12/27/2023    CREATININE 0.5 (L) 12/27/2023      SARS-CoV-2 (COVID-19) (no units)   Date/Time Value   12/27/2023 0037 Negative       Imaging  X-RAY CHEST 1 VIEW    Result Date: 12/27/2023  IMPRESSION:  Findings concerning for peribronchial vascular infectious/inflammatory disease in the right. See comment COMPARISON: Chest  studies January 2013, May 2016. COMMENT:  Upright AP portable imaging of the chest 0008 hours Normal lung volumes. Peribronchial vascular opacity in the right through the mid mid and lower lungs zones, concerning for inflammatory changes/pneumonia in the appropriate setting. No discernible pleural fluid. Normal cardiac size and pulmonary vascularity. Stable hilar and mediastinal contours. Unremarkable imaged upper abdomen. No acute osseous abnormality.     CT ABDOMEN PELVIS WITH IV CONTRAST    Result Date: 12/27/2023  IMPRESSION: There are nondilated fluid-filled bowel loops compatible with history of constipation.  Otherwise no acute abdominal/pelvic pathology appreciated. Incompletely imaged right middle lobe infiltrate.          ASSESSMENT AND PLAN      Infiltrate of middle lobe of right lung present on imaging study  Assessment & Plan  · S/p doxycycline, augmentin as outpt  · Given lack of respiratory symptoms, will monitor off antibiotics for now      Diarrhea  Assessment & Plan  > Ongoing diarrhea with fever, intermittent bloody output  > Possible IBD vs other  · GI consult  · C diff screen, stool culture, stool O&P  · Stool leukocytes, fecal calprotectin    Neurogenic bladder  Assessment & Plan  · Straight caths himself    * Fever, unknown origin  Assessment & Plan  > Unclear etiology, possible IBD vs intra-abdominal infection vs other  · Will monitor off antibiotics for now  · F/u Lyme studies, blood cultures  · GI consult, ID consult         VTE Assessment: Padua VTE Score: 5  VTE Prophylaxis:  Current anticoagulants:  enoxaparin (LOVENOX) syringe 40 mg, subcutaneous, Daily (6p)      Code Status: Full Code      Estimated Discharge Date: 12/28/2023     Disposition Planning: Await GI and ID evaluation for fever of unknown origin associated with diarrhea which has been ongoing for months     Cody Mcgee DO  12/27/2023

## 2023-12-27 NOTE — ASSESSMENT & PLAN NOTE
· S/p doxycycline, augmentin as outpt  · Given lack of respiratory symptoms, will monitor off antibiotics for now

## 2023-12-27 NOTE — CONSULTS
Infectious Disease Consult Note    Patient Name: Virgil Edge  MR#: [id number]  : 1955  Admission Date: 2023  Consult Date: 23 2:55 PM   Consultant: Shahriar Beyer MD    Reason for Consult: fever unknown origin, diarrhea  Referring Provider: Cody Mcgee      History of Present Illness     Virgil Edge is a 68 y.o. male with PMH of CAD, MS, BPH who was admitted on 2023 with diarrhea which has been ongoing since end . He had generalized weakness at that time and tested positive for COVID-19 on 10/28 and could only take 2 days of Paxlovid before diarrhea worsened. He had one loose BM daily since then but has had decreased appetite. He reported being diagnosed with UTI and received amox-clav on  by his PCP after being on ciprofloxacin initially on  and went to [facility] ED on  and was discharged on doxycyline. He has fever now but denies any cough, dyspnea, chest pain, abdominal pain, nausea, vomiting, dysuria or skin rashes. On admission, he was febrile at 101 without leukocytosis. CXR showed findings concerning for peribronchial vascular infectious/inflammatory disease in the right and CT A/P showed nondilated fluid-filled bowel loops compatible with history of constipation. Pt remains off abx with stool cx and blood cx in process with UA showing no pyuria.    Outside records were reviewed.    Allergies:   Allergies   Allergen Reactions   • Fluconazole      Unable to recall the reaction       Medical History:   Past Medical History:   Diagnosis Date   • BPH (benign prostatic hyperplasia)    • CAD (coronary artery disease)    • FHx: multiple sclerosis        Surgical History:   Past Surgical History:   Procedure Laterality Date   • CORONARY ANGIOPLASTY WITH STENT PLACEMENT  05/2016    x 3   • TRANSURETHRAL RESECTION OF PROSTATE         Social History:   Social History     Socioeconomic History   • Marital status:      Spouse name: None   • Number of  children: 2   • Years of education: None   • Highest education level: None   Occupational History   • Occupation: Retired   Tobacco Use   • Smoking status: Never   • Smokeless tobacco: Never   Substance and Sexual Activity   • Alcohol use: Not Currently     Alcohol/week: 1.0 standard drink of alcohol     Types: 1 Glasses of wine per week   • Drug use: Never     Social Determinants of Health     Food Insecurity: No Food Insecurity (12/26/2023)    Hunger Vital Sign    • Worried About Running Out of Food in the Last Year: Never true    • Ran Out of Food in the Last Year: Never true          Travel Exposure:   Travel and Exposure Screening    Flowsheet Row Most Recent Value   Travel Screening    Overnight hospitalization outside the [place] in the last year? No Filed On: 12/26/2023 1845   Exposure Screening    Symptoms           Family History: History reviewed. No pertinent family history.    Review of Systems    All other systems reviewed and negative except as noted in the HPI.    Medications:    Current IP Meds (From admission, onward)        Frequency     enoxaparin (LOVENOX) syringe 40 mg  (Assessed at increased VTE risk (Padua score greater than or equal to 4))         Daily (6p)     aspirin enteric coated tablet 81 mg         Daily     baclofen (LIORESAL) tablet 10 mg         2 times daily     cholecalciferol (vitamin D3) tablet 2,000 Units         Daily     clonazePAM (klonoPIN) tablet 0.5 mg         Daily     escitalopram (LEXAPRO) tablet 10 mg         Daily     metoprolol tartrate (LOPRESSOR) tablet 12.5 mg         2 times daily     rosuvastatin (CRESTOR) tablet 10 mg         Once per day on Monday Wednesday Friday     magnesium sulfate IVPB 1g in 100 mL NSS/D5W/SWFI  (Magnesium Replacement Orders - standard)        See Hyperspace for full Linked Orders Report.    As needed     magnesium sulfate IVPB 2g in 50 mL NSS/D5W/SWFI  (Magnesium Replacement Orders - standard)        See Hyperspace for full Linked Orders  Report.    As needed     potassium chloride (KLOR-CON M) ER tablet (particles/crystals) 20 mEq  (Potassium Replacement)         As needed     potassium chloride (KLOR-CON M) ER tablet (particles/crystals) 40 mEq  (Potassium Replacement)         As needed     potassium chloride 20 mEq in 100 mL IVPB  (premix)  (Potassium Replacement)         As needed     potassium chloride 20 mEq in 100 mL IVPB  (premix)  (Potassium Replacement)        See Hyperspace for full Linked Orders Report.    As needed     potassium chloride 20 mEq in 100 mL IVPB  (premix)  (Potassium Replacement)        See Hyperspace for full Linked Orders Report.    As needed     iopamidoL (ISOVUE-370) 370 mg iodine /mL (76 %) injection 100 mL         Once in imaging     glucose chewable tablet 16-32 g of dextrose  (Hypoglycemia Treatment Protocol and Hyperglycemia Validation Protocol)        See Hyperspace for full Linked Orders Report.    As needed     dextrose 40 % oral gel 15-30 g of dextrose  (Hypoglycemia Treatment Protocol and Hyperglycemia Validation Protocol)        See Hyperspace for full Linked Orders Report.    As needed     glucagon (GLUCAGEN) injection 1 mg  (Hypoglycemia Treatment Protocol and Hyperglycemia Validation Protocol)        See Hyperspace for full Linked Orders Report.    As needed     dextrose 50 % in water (D50) injection 12.5 g  (Hypoglycemia Treatment Protocol and Hyperglycemia Validation Protocol)        See Hyperspace for full Linked Orders Report.    As needed     acetaminophen (TYLENOL) tablet 650 mg  (Analgesics - Acetaminophen pain or fever)         Every 4 hours PRN     acetaminophen (TYLENOL) tablet 650 mg         Once     sodium chloride 0.9 % bolus 1,000 mL         Once                Objective     Vital Signs:    Patient Vitals for the past 72 hrs:   BP Temp Temp src Pulse Resp SpO2 Height Weight   12/27/23 1255 129/60 37.1 °C (98.7 °F) Temporal 97 16 97 % -- --   12/27/23 0831 -- 36.2 °C (97.1 °F) Temporal -- --  "-- -- --   23 0814 (!) 150/80 -- -- 62 18 97 % -- --   23 0613 -- -- -- 65 16 97 % -- --   23 0242 122/68 36.2 °C (97.1 °F) Temporal 64 16 94 % -- --   23 2311 -- (!) 38.3 °C (101 °F) Oral -- -- -- -- --   23 117/71 37.2 °C (98.9 °F) Temporal 79 16 95 % -- --   23 1843 (!) 148/90 (!) 38.1 °C (100.6 °F) Temporal 79 18 94 % 1.778 m (5' 10\") 61.2 kg (135 lb)       Temp (72hrs), Av.2 °C (98.9 °F), Min:36.2 °C (97.1 °F), Max:38.3 °C (101 °F)      Physical Exam:    General appearance: alert and cooperative  Head: normocephalic, without obvious abnormality, atraumatic  Eyes: anicteric sclera  Neck: supple  Lungs: clear to auscultation bilaterally  Heart: regular rate and rhythm  Abdomen: soft, non-tender  Extremities: edema none  Joints: no swelling  Skin: no rashes  Neurologic: Grossly normal    Lines, Drains, Airways, Wounds:  Peripheral IV (Adult) 23 Left Wrist (Active)   Number of days: 1       Labs:    CBC Results       12/27/23 12/26/23 10/05/20     0613 1847 1002    WBC 3.69 5.75 7.2    RBC 3.52 4.31 4.99    HGB 10.3 12.6 15.4    HCT 31.9 38.9 46.1    MCV 90.6 90.3 92    MCH 29.3 29.2 30.9    MCHC 32.3 32.4 33.4     218 176         Comment for HGB at 0613 on 23: RESULT CHECKED      BMP Results       12/27/23 12/26/23 10/05/20     0613 1847 1002     139 145    K 3.4 3.9 4.0    Cl 105 103 105    CO2 27 27 29    Glucose 88 125 103    BUN 15 17 13    Creatinine 0.5 0.6 0.87    Calcium 8.0 8.9 --    Anion Gap 7 9 --    EGFR >60.0 >60.0 89       103         Comment for K at 0613 on 23: Results obtained on plasma. Plasma Potassium values may be up to 0.4 mEQ/L less than serum values. The differences may be greater for patients with high platelet or white cell counts.    Comment for K at 1847 on 23: Results obtained on plasma. Plasma Potassium values may be up to 0.4 mEQ/L less than serum values. The differences may be greater for patients " with high platelet or white cell counts.    Comment for EGFR at 0613 on 12/27/23: Calculation based on the Chronic Kidney Disease Epidemiology Collaboration (CKD-EPI) equation refit without adjustment for race.    Comment for EGFR at 1847 on 12/26/23: Calculation based on the Chronic Kidney Disease Epidemiology Collaboration (CKD-EPI) equation refit without adjustment for race.      PT/PTT Results       06/16/16 05/15/16     1132 0933    PT 13.1 12.3    INR 1.0 0.9    PTT 28 --         Comment for INR at 1132 on 06/16/16: INR HAS NO DEFINED SIGNIFICANCE WHEN PT IS WITHIN THE REFERENCE RANGE.    Comment for INR at 0933 on 05/15/16: INR HAS NO DEFINED SIGNIFICANCE WHEN PT IS WITHIN THE REFERENCE RANGE.      UA Results       12/27/23     0035    Color Yellow    Clarity Clear    Glucose Negative    Bilirubin Negative    Ketones +1    Sp Grav 1.028    Blood Trace    Ph 6.0    Protein +1    Urobilinogen 2.0    Nitrite Negative    Leuk Est Negative    WBC 0 TO 3    RBC 0 TO 4    Bacteria None Seen         Comment for Ketones at 0035 on 12/27/23: Free sulfhydryl drugs such as Mesna, Capoten, and Acetylcysteine (Mucomyst) may cause false positive ketonuria.    Comment for Blood at 0035 on 12/27/23: The sensitivity of the occult blood test is equivalent to approximately 4 intact RBC/HPF.    Comment for Leuk Est at 0035 on 12/27/23: Results can be falsely negative due to high specific gravity, some antibiotics, glucose >3 g/dl, or WBC other than neutrophils.      Lactate Results       12/27/23 0032    Lactate 1.6          Microbiology Results     Procedure Component Value Units Date/Time    SARS-CoV-2 (COVID-19) Nasopharynx [[id number]]  (Normal) Collected: 12/27/23 0037    Specimen: Nasopharyngeal Swab from Nasopharynx Updated: 12/27/23 0132    Narrative:      The following orders were created for panel order SARS-CoV-2 (COVID-19) Nasopharynx.  Procedure                               Abnormality         Status                      ---------                               -----------         ------                     SARS-COV-2 (COVID-19)/ F...[[id number]]  Normal              Final result                 Please view results for these tests on the individual orders.    SARS-COV-2 (COVID-19)/ FLU A/B, AND RSV, PCR Nasopharynx [[id number]]  (Normal) Collected: 12/27/23 0037    Specimen: Nasopharyngeal Swab from Nasopharynx Updated: 12/27/23 0132     SARS-CoV-2 (COVID-19) Negative     Influenza A Negative     Influenza B Negative     Respiratory Syncytial Virus Negative    Narrative:      Testing performed using real-time PCR for detection of COVID-19. EUA approved validation studies performed on site.     Parasites, peripheral blood Blood, Venous [[id number]] Collected: 12/27/23 0032    Specimen: Blood, Venous Updated: 12/27/23 1328     Parasites, Peripheral Blood Smear --     Pathologist Interpretation No parasites seen.    Gilmar Dumont DO      Electronically signed by Gilmar Dumont DO on December 27, 2023 at 1:28 PM.          Pathology Results     ** No results found for the last 720 hours. **          Echo:         Imaging:    Radiology Imaging    XR CHEST 1 VW    Narrative  CLINICAL HISTORY:  Fever    Impression  IMPRESSION:  Findings concerning for peribronchial vascular  infectious/inflammatory disease in the right. See comment    COMPARISON: Chest studies January 2013, May 2016.    COMMENT:  Upright AP portable imaging of the chest 0008 hours  Normal lung volumes. Peribronchial vascular opacity in the right through the mid  mid and lower lungs zones, concerning for inflammatory changes/pneumonia in the  appropriate setting.  No discernible pleural fluid.  Normal cardiac size and pulmonary vascularity.  Stable hilar and mediastinal contours.    Unremarkable imaged upper abdomen.  No acute osseous abnormality.      Assessment     1. Diarrhea - in the setting of diarrhea ongoing since end of October after testing positive for  COVID-19 at that time. Possibly viral etiology. Pt only has one loose BM daily and C diff PCR is pending after pt has been on abx outpatient but CT A/P shows nondilated fluid-filled bowel loops compatible with history of constipation.     2. Recent dx of COVID-19 - pt tested positive on 10/28 with no respiratory symptoms being reported now. CXR shows findings concerning for peribronchial vascular infectious/inflammatory disease in the right for which he was on doxycycline outpatient.    3. Fever - in the setting of #1,2. Blood cx are in process with UA showing no pyuria after recent dx of UTI outpatient.        Plan     1. Continue to monitor off antibiotics while waiting for blood cx and C diff PCR, and stool cx and monitor for improvement in fever curve.

## 2023-12-27 NOTE — CONSULTS
GASTROENTEROLOGY CONSULTATION   [facility]     PATIENT NAME:  Virgil Edge        YOB: 1955   AGE:  68 y.o.         MRN:  [id number]              HISTORY   CC: diarrhea, fever    68-year-old male, known to our practice, with history of BPH, CAD, MS, chronic indwelling Diana who presented to ED 12/27 for fever x 5 days, diarrhea times several weeks.    Patient reports he has hx of chronic diarrhea since 2015, managed with Benefiber but recently has been experiencing breakthrough diarrhea intermittently over the last ~2 months, and acutely worsened following diagnosis of COVID in late November and after using Paxlovid for 2 days.  He underwent stool testing with his PCP 11/13 including C. difficile/crypto/Giardia/stool culture/H. pylori stool/fecal leukocytes all of which was negative.  He was then seen by our practice 11/22/2023 for persistent symptoms and underwent testing including fecal fat/fecal Fredo/pancreatic elastase all of which was also normal.  Endorses about 10 pounds of unintentional weight loss due to decreasing p.o. intake due to concerns for diarrhea.  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  Last colonoscopy 7/8/2021 (Dr. Medina) notable for 4 polyps (path: adenoma x3), partial rectal prolapse and diverticulosis with recall in 5 yrs. he then developed daily fevers x 5 days, concerned about possible UTI and started on Augmentin by PCP, fever persisted and he was seen at [facility] ED and started on doxycycline as well as the Augmentin for possible pneumonia.  He continues to have fevers despite the antibiotics and presented to [facility] for evaluation.  Denies change in his diarrhea since the onset of fevers and use of antibiotics.  Denies abdominal pain, nausea, vomiting, constipation.  Denies known sick contacts, recent travel.    In ED, Tmax 101, vital signs otherwise stable, UA negative, hemoglobin 12.6, CT A/P  with contrast with fluid density within portions of colon, consistent with diarrhea, no bowel wall thickening or abnormal bowel dilatation, +right middle lobe infiltrate.  COVID/flu/RSV negative, blood culture pending, repeat C. difficile pending.  ID consult pending.     PAST MEDICAL HISTORY     Past Medical History:   Diagnosis Date   • BPH (benign prostatic hyperplasia)    • CAD (coronary artery disease)    • FHx: multiple sclerosis        PAST SURGICAL HISTORY     Past Surgical History:   Procedure Laterality Date   • CORONARY ANGIOPLASTY WITH STENT PLACEMENT  05/2016    x 3   • TRANSURETHRAL RESECTION OF PROSTATE  2013        FAMILY HISTORY   History reviewed. No pertinent family history.    SOCIAL HISTORY     Social History     Tobacco Use   • Smoking status: Never   • Smokeless tobacco: Never   Substance Use Topics   • Alcohol use: Not Currently     Alcohol/week: 1.0 standard drink of alcohol     Types: 1 Glasses of wine per week       MEDICATIONS   Home Medication:  Not in a hospital admission.    MEDICATIONS:  Infusions:         Scheduled:   • aspirin  81 mg oral Daily   • baclofen  10 mg oral BID   • cholecalciferol (vitamin D3)  2,000 Units oral Daily   • clonazePAM  0.5 mg oral Daily   • enoxaparin  40 mg subcutaneous Daily (6p)   • escitalopram  10 mg oral Daily   • metoprolol tartrate  12.5 mg oral BID   • rosuvastatin  10 mg oral Once per day on Monday Wednesday Friday       ALLERGIES     Allergies   Allergen Reactions   • Fluconazole      Unable to recall the reaction       REVIEW OF SYSTEMS   Systems reviewed and otherwise negative except as documented above.    PHYSICAL EXAMINATION   Temp:  [36.2 °C (97.1 °F)-38.3 °C (101 °F)] 36.2 °C (97.1 °F)  Heart Rate:  [62-79] 62  Resp:  [16-18] 18  BP: (117-150)/(68-90) 150/80      Intake/Output Summary (Last 24 hours) at 12/27/2023 0923  Last data filed at 12/27/2023 0243  Gross per 24 hour   Intake 1000 ml   Output --   Net 1000 ml       General  appearance: well developed, well nourished, no acute distress  Head: normocephalic, atraumatic  Eyes: EOMI  ENT: oral mucosa moist  Lungs: non-labored respirations  Heart: regular rate   Abdomen: soft, non-tender; bowel sounds normal  Extremities: no edema  Skin:  Warm, dry, no rashes, no lesions, no jaundice  Neurologic: awake, alert & oriented x 3  Psych: cooperative with exam, appropriate mood and affect    Diagnostic Data:     Labs reviewed  Results from last 7 days   Lab Units 12/27/23  0613 12/26/23  1847   WBC K/uL 3.69* 5.75   HEMOGLOBIN g/dL 10.3* 12.6*   HEMATOCRIT % 31.9* 38.9*   PLATELETS K/uL 158 218   ]  Results from last 7 days   Lab Units 12/27/23  0613 12/26/23  1847   SODIUM mEQ/L 139 139   POTASSIUM mEQ/L 3.4* 3.9   CHLORIDE mEQ/L 105 103   CO2 mEQ/L 27 27   BUN mg/dL 15 17   CREATININE mg/dL 0.5* 0.6*   CALCIUM mg/dL 8.0* 8.9   ALBUMIN g/dL  --  3.4*   BILIRUBIN TOTAL mg/dL  --  0.5   ALK PHOS IU/L  --  48   ALT IU/L  --  11   AST IU/L  --  16   GLUCOSE mg/dL 88 125*   ]    ]    ]    Imaging reviewed  CT ABDOMEN PELVIS WITH IV CONTRAST    Result Date: 12/27/2023  IMPRESSION: There are nondilated fluid-filled bowel loops compatible with history of constipation.  Otherwise no acute abdominal/pelvic pathology appreciated. Incompletely imaged right middle lobe infiltrate.         ASSESSMENT/PLAN   68-year-old male, known to our practice, with history of BPH, CAD, MS, chronic indwelling Diana being seen for diarrhea, new fever. No change to diarrhea since onset of fever, diarrhea present for about 2months, progressive. Infectious/chronic stool workup neg as above. Repeat cdiff pending. Suspect fever is related to PNA, less likely diarrhea. ID consult pending and infectious workup ongoing.     1. Diarrhea  2. Fever  3. Unintentional weight loss  4. Right middle lobe infiltrate  5. Hx of chronic indwelling Diana  6. Hx of MS    Plan:   - Infectious workup, ID consult pending  - Await repeat cdiff  -  Diet as tolerated  - Consider scheduled imodium vs trial of cholestyramine  - OP GI followup- needs dx colonoscopy, do not recommend IP with current middle lobe infiltrate/PNA.   - D/W attending     AUTHOR:  DYLAN Hill  12/27/2023

## 2023-12-27 NOTE — ED PROVIDER NOTES
HPI   HISTORY OF PRESENT ILLNESS     Patient presents emergency department complaining of fever for the past week, states that he had COVID in November, and was on Paxlovid for 2 days which produced significant diarrhea-patient was seen by GI, had stool and blood work which were all negative.  Has been taking Imodium for the diarrhea.  Patient denies any abdominal pain, no nausea or vomiting.  Patient also states that he uses a straight cath 4 times a day since 2013.    States that fever started 1 week ago, thought to have a UTI and was placed on Augmentin-urine culture was negative and patient went to [facility] for evaluation.  Patient found to have left perihilar infiltrate and started on doxycycline as well as Augmentin.  Patient states fever Tmax 101 has been ongoing.  Patient denies cough, shortness of breath.  Denies any rashes, swelling, sore throat or other URI symptoms.  No known sick contacts    Patient does have multiple sclerosis, gets by annual infusion of Ocrevus      History provided by:  Patient and spouse  Fever  Max temp prior to arrival:  101  Temp source:  Oral  Severity:  Moderate  Onset quality:  Gradual  Duration:  1 week  Timing:  Intermittent  Progression:  Waxing and waning  Chronicity:  New  Relieved by:  Nothing  Worsened by:  Nothing  Ineffective treatments:  Acetaminophen  Associated symptoms: diarrhea    Associated symptoms: no chest pain, no chills, no confusion, no cough, no dysuria, no ear pain, no headaches, no myalgias, no nausea, no rash, no rhinorrhea, no sore throat and no vomiting    Risk factors: immunosuppression    Risk factors: no recent travel and no sick contacts    Weakness - Generalized  Associated symptoms: diarrhea and fever    Associated symptoms: no abdominal pain, no arthralgias, no chest pain, no cough, no dysuria, no headaches, no myalgias, no nausea, no seizures, no shortness of breath and no vomiting          Patient History   PAST HISTORY      Reviewed from Nursing Triage:       Past Medical History:   Diagnosis Date   • BPH (benign prostatic hyperplasia)    • CAD (coronary artery disease)    • FHx: multiple sclerosis        Past Surgical History:   Procedure Laterality Date   • CORONARY ANGIOPLASTY WITH STENT PLACEMENT  05/2016    x 3   • TRANSURETHRAL RESECTION OF PROSTATE  2013       History reviewed. No pertinent family history.    Social History     Tobacco Use   • Smoking status: Never   • Smokeless tobacco: Never   Substance Use Topics   • Alcohol use: Not Currently     Alcohol/week: 1.0 standard drink of alcohol     Types: 1 Glasses of wine per week   • Drug use: Never         Review of Systems   REVIEW OF SYSTEMS     Review of Systems   Constitutional: Positive for fever and unexpected weight change (losing weight). Negative for activity change and chills.   HENT: Negative for ear pain, rhinorrhea and sore throat.    Respiratory: Negative for cough and shortness of breath.    Cardiovascular: Negative for chest pain and leg swelling.   Gastrointestinal: Positive for diarrhea. Negative for abdominal pain, nausea and vomiting.   Genitourinary: Negative for difficulty urinating, dysuria, flank pain and hematuria.   Musculoskeletal: Negative for arthralgias, back pain, myalgias and neck pain.   Skin: Positive for wound (left buttock wound). Negative for color change and rash.   Neurological: Negative for seizures, syncope, speech difficulty, weakness and headaches.   Psychiatric/Behavioral: Negative for agitation, behavioral problems and confusion.         VITALS     ED Vitals    Date/Time Temp Pulse Resp BP SpO2 Who   12/27/23 0242 36.2 °C (97.1 °F) 64 16 122/68 94 % JL   12/26/23 2311 38.3 °C (101 °F) -- -- -- -- NKD   12/26/23 2056 37.2 °C (98.9 °F) 79 16 117/71 95 % SDB   12/26/23 1843 38.1 °C (100.6 °F) 79 18 148/90 94 % HMS        Pulse Ox %: 94 % (12/27/23 0308)  Pulse Ox Interpretation: Normal (12/27/23 0308)           Physical Exam    PHYSICAL EXAM     Physical Exam  Vitals and nursing note reviewed.   Constitutional:       General: He is not in acute distress.     Appearance: He is well-developed.   HENT:      Head: Normocephalic and atraumatic.   Eyes:      General: No scleral icterus.     Conjunctiva/sclera: Conjunctivae normal.   Cardiovascular:      Rate and Rhythm: Normal rate and regular rhythm.   Pulmonary:      Effort: Pulmonary effort is normal. No respiratory distress.      Breath sounds: Normal breath sounds. No wheezing or rhonchi.   Chest:      Chest wall: No tenderness.   Abdominal:      General: There is no distension.      Palpations: Abdomen is soft. There is no mass.      Tenderness: There is no abdominal tenderness. There is no right CVA tenderness or left CVA tenderness.   Musculoskeletal:         General: No tenderness or deformity. Normal range of motion.      Cervical back: Normal range of motion. No tenderness.      Right lower leg: No edema.      Left lower leg: No edema.   Lymphadenopathy:      Cervical: No cervical adenopathy.   Skin:     General: Skin is warm and dry.      Findings: No rash.      Comments: Left mid buttock just lateral to anus with approximately 1 cm mildly tender, mildly erythematous superficial ulceration.  No surrounding edema, no fluctuance, no discharge.   Neurological:      Mental Status: He is alert and oriented to person, place, and time. Mental status is at baseline.   Psychiatric:         Behavior: Behavior normal.           PROCEDURES     Procedures     DATA     Results     Procedure Component Value Units Date/Time    SARS-CoV-2 (COVID-19) Nasopharynx [[id number]]  (Normal) Collected: 12/27/23 0037    Specimen: Nasopharyngeal Swab from Nasopharynx Updated: 12/27/23 0132    Narrative:      The following orders were created for panel order SARS-CoV-2 (COVID-19) Nasopharynx.  Procedure                               Abnormality         Status                     ---------                                -----------         ------                     SARS-COV-2 (COVID-19)/ F...[[id number]]  Normal              Final result                 Please view results for these tests on the individual orders.    SARS-COV-2 (COVID-19)/ FLU A/B, AND RSV, PCR Nasopharynx [[id number]]  (Normal) Collected: 12/27/23 0037    Specimen: Nasopharyngeal Swab from Nasopharynx Updated: 12/27/23 0132     SARS-CoV-2 (COVID-19) Negative     Influenza A Negative     Influenza B Negative     Respiratory Syncytial Virus Negative    Narrative:      Testing performed using real-time PCR for detection of COVID-19. EUA approved validation studies performed on site.     Blood Culture Blood, Venous [[id number]] Collected: 12/27/23 0032    Specimen: Blood, Venous Updated: 12/27/23 0108    UA w/ reflex culture (ED Only) [[id number]]  (Abnormal) Collected: 12/27/23 0035    Specimen: Urine, Clean Catch Updated: 12/27/23 0056    Narrative:      The following orders were created for panel order UA w/ reflex culture (ED Only).  Procedure                               Abnormality         Status                     ---------                               -----------         ------                     UA Reflex to Culture (Ma...[[id number]]  Abnormal            Final result               UA Microscopic[[id number]]               Abnormal            Final result                 Please view results for these tests on the individual orders.    UA Microscopic [[id number]]  (Abnormal) Collected: 12/27/23 0035    Specimen: Urine, Clean Catch Updated: 12/27/23 0056     RBC, Urine 0 TO 4 /HPF      WBC, Urine 0 TO 3 /HPF      Squamous Epithelial Rare /hpf      Hyaline Cast None Seen /lpf      Bacteria, Urine None Seen /HPF      Mucus Rare /LPF     UA Reflex to Culture (Macroscopic) [[id number]]  (Abnormal) Collected: 12/27/23 0035    Specimen: Urine, Clean Catch Updated: 12/27/23 0055     Color, Urine Yellow     Clarity, Urine Clear     Specific Gravity, Urine 1.028      pH, Urine 6.0     Leukocyte Esterase Negative     Comment: Results can be falsely negative due to high specific gravity, some antibiotics, glucose >3 g/dl, or WBC other than neutrophils.        Nitrite, Urine Negative     Protein, Urine +1     Glucose, Urine Negative mg/dL      Ketones, Urine +1 mg/dL      Comment: Free sulfhydryl drugs such as Mesna, Capoten, and Acetylcysteine (Mucomyst) may cause false positive ketonuria.        Urobilinogen, Urine 2.0 EU/dL      Bilirubin, Urine Negative mg/dL      Blood, Urine Trace     Comment: The sensitivity of the occult blood test is equivalent to approximately 4 intact RBC/HPF.       Lactate, w/ reflex repeat if > 2.0 [[id number]]  (Normal) Collected: 12/27/23 0032    Specimen: Blood, Venous Updated: 12/27/23 0055     Lactate 1.6 mmol/L     Parasites, peripheral blood Blood, Venous [[id number]] Collected: 12/27/23 0032    Specimen: Blood, Venous Updated: 12/27/23 0049    [place] EIA reflex WB [[id number]] Collected: 12/26/23 1847    Specimen: Blood, Venous Updated: 12/26/23 2346    Blood Culture Blood, Venous [[id number]] Collected: 12/26/23 1847    Specimen: Blood, Venous Updated: 12/26/23 2345    Anaplasma Phagocytophilum DNA, PCR [[id number]] Collected: 12/26/23 1847    Specimen: Blood, Venous Updated: 12/26/23 2344    HS Troponin I (with 2 hour reflex) [[id number]]  (Normal) Collected: 12/26/23 1847    Specimen: Blood, Venous Updated: 12/26/23 1941     High Sens Troponin I 4.2 pg/mL     Comprehensive metabolic panel [[id number]]  (Abnormal) Collected: 12/26/23 1847    Specimen: Blood, Venous Updated: 12/26/23 1936     Sodium 139 mEQ/L      Potassium 3.9 mEQ/L      Comment: Results obtained on plasma. Plasma Potassium values may be up to 0.4 mEQ/L less than serum values. The differences may be greater for patients with high platelet or white cell counts.        Chloride 103 mEQ/L      CO2 27 mEQ/L      BUN 17 mg/dL      Creatinine 0.6 mg/dL      Glucose 125 mg/dL      Calcium  8.9 mg/dL      AST (SGOT) 16 IU/L      ALT (SGPT) 11 IU/L      Alkaline Phosphatase 48 IU/L      Total Protein 6.4 g/dL      Comment: Test performed on plasma which typically contains approximately 0.4 g/dL more protein than serum.        Albumin 3.4 g/dL      Bilirubin, Total 0.5 mg/dL      eGFR >60.0 mL/min/1.73m*2      Comment: Calculation based on the Chronic Kidney Disease Epidemiology Collaboration (CKD-EPI) equation refit without adjustment for race.        Anion Gap 9 mEQ/L     CBC and differential [[id number]]  (Abnormal) Collected: 12/26/23 1847    Specimen: Blood, Venous Updated: 12/26/23 1906     WBC 5.75 K/uL      RBC 4.31 M/uL      Hemoglobin 12.6 g/dL      Hematocrit 38.9 %      MCV 90.3 fL      MCH 29.2 pg      MCHC 32.4 g/dL      RDW 13.4 %      Platelets 218 K/uL      MPV 10.9 fL      Differential Type Auto     nRBC 0.0 %      Immature Granulocytes 0.7 %      Neutrophils 80.7 %      Lymphocytes 11.1 %      Monocytes 6.4 %      Eosinophils 0.9 %      Basophils 0.2 %      Immature Granulocytes, Absolute 0.04 K/uL      Neutrophils, Absolute 4.64 K/uL      Lymphocytes, Absolute 0.64 K/uL      Monocytes, Absolute 0.37 K/uL      Eosinophils, Absolute 0.05 K/uL      Basophils, Absolute 0.01 K/uL     RAINBOW RED [[id number]] Collected: 12/26/23 1847    Specimen: Blood, Venous Updated: 12/26/23 1902    RAINBOW LT BLUE [[id number]] Collected: 12/26/23 1847    Specimen: Blood, Venous Updated: 12/26/23 1902    RAINBOW GOLD [[id number]] Collected: 12/26/23 1847    Specimen: Blood, Venous Updated: 12/26/23 1902    [place] Draw Panel [[id number]] Collected: 12/26/23 1847    Specimen: Blood, Venous Updated: 12/26/23 1901    Narrative:      The following orders were created for panel order [place] Draw Panel.  Procedure                               Abnormality         Status                     ---------                               -----------         ------                     RAINBOW RED[[id number]]                                       In process                 DOREEN LT BLUE[[id number]]                                  In process                 DOREEN GOLD[[id number]]                                     In process                 [place] Blood Culture[[id number]]                            In process                   Please view results for these tests on the individual orders.    [place] Blood Culture [[id number]] Collected: 12/26/23 1847    Specimen: Blood, Venous Updated: 12/26/23 1901          Imaging Results          X-RAY CHEST 1 VIEW (Preliminary result)  Result time 12/27/23 01:50:25    Preliminary Interpretation    Rll infiltrate                                EKG 12 lead    (Results Pending)       Scoring tools                                  ED Course & MDM   MDM / ED COURSE / CLINICAL IMPRESSION / DISPO   Vital Signs Review: Vital signs have been reviewed.   The oxygen saturation is SpO2: 94 % which is normal.    Medical Decision Making  Fever, unspecified fever cause: acute illness or injury  Pneumonia of right lung due to infectious organism, unspecified part of lung: acute illness or injury  Amount and/or Complexity of Data Reviewed  Independent Historian: spouse  External Data Reviewed: radiology and notes.     Details: From [facility] last week  Labs: ordered.  Radiology: ordered.  ECG/medicine tests: ordered.      Risk  OTC drugs.  Decision regarding hospitalization.            ED Course as of 12/27/23 0308   Wed Dec 27, 2023   0143 [facility] [place] [facility] paged. [ND]   0248 Case was discussed with hospitalist Helder.  Reviewed patient's presentation, ED course, and relevant data.  Hospitalist accepts patient on their service and will see / admit pt.   [ND]      ED Course User Index  [ND] Mary Carmen Isabel PA C     Clinical Impression      Pneumonia of right lung due to infectious organism, unspecified part of lung   Fever, unspecified fever cause     _________________     ED Disposition   Admit / Observation                   This document was created using Dragon dictation software.  There might be some typographical errors due to this technology.    We are in a period of time with increased volumes and decreased capacity.      Mary Carmen Isabel PA C  12/27/23 0306

## 2023-12-27 NOTE — NURSING NOTE
Pt arrived to floor, aox4. VSS. Low grade temp of 99 noted, will continue to monitor. Pt denies pain. Pt oriented to new room and call bhardwaj . Walker to bedside for ambulation. Plan of care ongoing

## 2023-12-28 VITALS
BODY MASS INDEX: 19.33 KG/M2 | OXYGEN SATURATION: 93 % | SYSTOLIC BLOOD PRESSURE: 155 MMHG | HEART RATE: 74 BPM | TEMPERATURE: 99.8 F | DIASTOLIC BLOOD PRESSURE: 71 MMHG | RESPIRATION RATE: 18 BRPM | WEIGHT: 135 LBS | HEIGHT: 70 IN

## 2023-12-28 LAB
ANION GAP SERPL CALC-SCNC: 12 MEQ/L (ref 3–15)
BUN SERPL-MCNC: 13 MG/DL (ref 7–25)
CALCIUM SERPL-MCNC: 8.1 MG/DL (ref 8.6–10.3)
CHLORIDE SERPL-SCNC: 103 MEQ/L (ref 98–107)
CO2 SERPL-SCNC: 20 MEQ/L (ref 21–31)
CREAT SERPL-MCNC: 0.6 MG/DL (ref 0.7–1.3)
EGFRCR SERPLBLD CKD-EPI 2021: >60 ML/MIN/1.73M*2
ERYTHROCYTE [DISTWIDTH] IN BLOOD BY AUTOMATED COUNT: 13.5 % (ref 11.6–14.4)
GLUCOSE SERPL-MCNC: 84 MG/DL (ref 70–99)
HCT VFR BLDCO AUTO: 33.7 % (ref 40.1–51)
HGB BLD-MCNC: 10.7 G/DL (ref 13.7–17.5)
MAGNESIUM SERPL-MCNC: 1.7 MG/DL (ref 1.8–2.5)
MCH RBC QN AUTO: 29.2 PG (ref 28–33.2)
MCHC RBC AUTO-ENTMCNC: 31.8 G/DL (ref 32.2–36.5)
MCV RBC AUTO: 91.8 FL (ref 83–98)
O+P SPEC MICRO: NORMAL
PDW BLD AUTO: 10.6 FL (ref 9.4–12.4)
PHOSPHATE SERPL-MCNC: 1.7 MG/DL (ref 2.4–4.7)
PLATELET # BLD AUTO: 190 K/UL (ref 150–350)
POTASSIUM SERPL-SCNC: 4.2 MEQ/L (ref 3.5–5.1)
RBC # BLD AUTO: 3.67 M/UL (ref 4.5–5.8)
SODIUM SERPL-SCNC: 135 MEQ/L (ref 136–145)
WBC # BLD AUTO: 5.84 K/UL (ref 3.8–10.5)

## 2023-12-28 PROCEDURE — 84100 ASSAY OF PHOSPHORUS: CPT | Mod: 59 | Performed by: STUDENT IN AN ORGANIZED HEALTH CARE EDUCATION/TRAINING PROGRAM

## 2023-12-28 PROCEDURE — 63700000 HC SELF-ADMINISTRABLE DRUG: Performed by: STUDENT IN AN ORGANIZED HEALTH CARE EDUCATION/TRAINING PROGRAM

## 2023-12-28 PROCEDURE — 85027 COMPLETE CBC AUTOMATED: CPT | Performed by: STUDENT IN AN ORGANIZED HEALTH CARE EDUCATION/TRAINING PROGRAM

## 2023-12-28 PROCEDURE — 36415 COLL VENOUS BLD VENIPUNCTURE: CPT | Performed by: STUDENT IN AN ORGANIZED HEALTH CARE EDUCATION/TRAINING PROGRAM

## 2023-12-28 PROCEDURE — G0378 HOSPITAL OBSERVATION PER HR: HCPCS

## 2023-12-28 PROCEDURE — 80048 BASIC METABOLIC PNL TOTAL CA: CPT | Mod: 59 | Performed by: STUDENT IN AN ORGANIZED HEALTH CARE EDUCATION/TRAINING PROGRAM

## 2023-12-28 PROCEDURE — 63700000 HC SELF-ADMINISTRABLE DRUG: Performed by: INTERNAL MEDICINE

## 2023-12-28 PROCEDURE — 83735 ASSAY OF MAGNESIUM: CPT | Performed by: STUDENT IN AN ORGANIZED HEALTH CARE EDUCATION/TRAINING PROGRAM

## 2023-12-28 PROCEDURE — 99238 HOSP IP/OBS DSCHRG MGMT 30/<: CPT | Performed by: STUDENT IN AN ORGANIZED HEALTH CARE EDUCATION/TRAINING PROGRAM

## 2023-12-28 RX ORDER — CHOLESTYRAMINE 4 G/9G
1 POWDER, FOR SUSPENSION ORAL DAILY
Qty: 30 PACKET | Refills: 0 | Status: SHIPPED | OUTPATIENT
Start: 2023-12-29 | End: 2025-03-18 | Stop reason: ALTCHOICE

## 2023-12-28 RX ORDER — CLONAZEPAM 0.5 MG/1
0.5 TABLET ORAL NIGHTLY
Status: DISCONTINUED | OUTPATIENT
Start: 2023-12-28 | End: 2023-12-28 | Stop reason: HOSPADM

## 2023-12-28 RX ORDER — SODIUM,POTASSIUM PHOSPHATES 280-250MG
2 POWDER IN PACKET (EA) ORAL
Status: DISCONTINUED | OUTPATIENT
Start: 2023-12-28 | End: 2023-12-28 | Stop reason: HOSPADM

## 2023-12-28 RX ORDER — CHOLESTYRAMINE 4 G/9G
1 POWDER, FOR SUSPENSION ORAL DAILY
Qty: 30 PACKET | Refills: 0 | Status: SHIPPED | OUTPATIENT
Start: 2023-12-29 | End: 2023-12-28

## 2023-12-28 RX ADMIN — POTASSIUM & SODIUM PHOSPHATES POWDER PACK 280-160-250 MG 2 PACKET: 280-160-250 PACK at 11:50

## 2023-12-28 RX ADMIN — CHOLESTYRAMINE 1 PACKET: 4 POWDER, FOR SUSPENSION ORAL at 11:52

## 2023-12-28 RX ADMIN — ESCITALOPRAM OXALATE 10 MG: 10 TABLET ORAL at 08:44

## 2023-12-28 RX ADMIN — METOPROLOL TARTRATE 12.5 MG: 25 TABLET, FILM COATED ORAL at 08:43

## 2023-12-28 RX ADMIN — BACLOFEN 10 MG: 10 TABLET ORAL at 08:43

## 2023-12-28 RX ADMIN — Medication 2000 UNITS: at 08:43

## 2023-12-28 RX ADMIN — ASPIRIN 81 MG: 81 TABLET, COATED ORAL at 08:43

## 2023-12-28 RX ADMIN — POTASSIUM & SODIUM PHOSPHATES POWDER PACK 280-160-250 MG 2 PACKET: 280-160-250 PACK at 08:44

## 2023-12-28 ASSESSMENT — COGNITIVE AND FUNCTIONAL STATUS - GENERAL
CLIMB 3 TO 5 STEPS WITH RAILING: 3 - A LITTLE
STANDING UP FROM CHAIR USING ARMS: 3 - A LITTLE
MOVING TO AND FROM BED TO CHAIR: 3 - A LITTLE
WALKING IN HOSPITAL ROOM: 3 - A LITTLE

## 2023-12-28 NOTE — PROGRESS NOTES
"Infectious Disease Progress Note    Patient Name: Virgil Edge  MR#: [id number]  : 1955  Admission Date: 2023  Date: 23   Time: 12:43 PM   Author: Shahriar Beyer MD    Major Events: none    Antibiotics:        Subjective     Review of Systems    Remains afebrile and denies any more diarrhea now. Tolerating clears well and remainder of 12 ROS is unchanged.    Objective     Vital Signs:    Patient Vitals for the past 72 hrs:   BP Temp Temp src Pulse Resp SpO2 Height Weight   23 0748 (!) 155/71 37.7 °C (99.8 °F) Oral 74 18 93 % -- --   23 2325 (!) 104/58 37.3 °C (99.1 °F) Oral 69 16 93 % -- --   23 -- 37.8 °C (100 °F) -- -- -- -- -- --   23 135/63 -- -- 79 -- -- -- --   23 1537 (!) 169/76 37.5 °C (99.5 °F) Oral 72 16 99 % -- --   23 1255 129/60 37.1 °C (98.7 °F) Temporal 97 16 97 % -- --   23 0831 -- 36.2 °C (97.1 °F) Temporal -- -- -- -- --   23 0814 (!) 150/80 -- -- 62 18 97 % -- --   23 0613 -- -- -- 65 16 97 % -- --   23 0242 122/68 36.2 °C (97.1 °F) Temporal 64 16 94 % -- --   23 2311 -- (!) 38.3 °C (101 °F) Oral -- -- -- -- --   236 117/71 37.2 °C (98.9 °F) Temporal 79 16 95 % -- --   23 1843 (!) 148/90 (!) 38.1 °C (100.6 °F) Temporal 79 18 94 % 1.778 m (5' 10\") 61.2 kg (135 lb)       Temp (72hrs), Av.3 °C (99.2 °F), Min:36.2 °C (97.1 °F), Max:38.3 °C (101 °F)      Physical Exam:    General appearance: NAD  Head: normocephalic, without obvious abnormality, atraumatic  Eyes: anicteric sclera  Neck: supple  Lungs: clear to auscultation bilaterally  Heart: regular rate and rhythm  Abdomen: soft, non-tender  Extremities: edema none  Joints: no swelling  Skin: no rashes  Neurologic: following commands    Lines, Drains, Airways, Wounds:  Peripheral IV (Adult) 23 Left Wrist (Active)   Number of days: 2       Labs:    CBC Results       23     0546 0613 1847    WBC 5.84 3.69 " 5.75    RBC 3.67 3.52 4.31    HGB 10.7 10.3 12.6    HCT 33.7 31.9 38.9    MCV 91.8 90.6 90.3    MCH 29.2 29.3 29.2    MCHC 31.8 32.3 32.4     158 218         Comment for HGB at 0613 on 12/27/23: RESULT CHECKED      BMP Results       12/28/23 12/27/23 12/26/23     0546 0613 1847     139 139    K 4.2 3.4 3.9    Cl 103 105 103    CO2 20 27 27    Glucose 84 88 125    BUN 13 15 17    Creatinine 0.6 0.5 0.6    Calcium 8.1 8.0 8.9    Anion Gap 12 7 9    EGFR >60.0 >60.0 >60.0         Comment for K at 0613 on 12/27/23: Results obtained on plasma. Plasma Potassium values may be up to 0.4 mEQ/L less than serum values. The differences may be greater for patients with high platelet or white cell counts.    Comment for K at 1847 on 12/26/23: Results obtained on plasma. Plasma Potassium values may be up to 0.4 mEQ/L less than serum values. The differences may be greater for patients with high platelet or white cell counts.    Comment for EGFR at 0546 on 12/28/23: Calculation based on the Chronic Kidney Disease Epidemiology Collaboration (CKD-EPI) equation refit without adjustment for race.    Comment for EGFR at 0613 on 12/27/23: Calculation based on the Chronic Kidney Disease Epidemiology Collaboration (CKD-EPI) equation refit without adjustment for race.    Comment for EGFR at 1847 on 12/26/23: Calculation based on the Chronic Kidney Disease Epidemiology Collaboration (CKD-EPI) equation refit without adjustment for race.      PT/PTT Results       06/16/16 05/15/16     1132 0933    PT 13.1 12.3    INR 1.0 0.9    PTT 28 --         Comment for INR at 1132 on 06/16/16: INR HAS NO DEFINED SIGNIFICANCE WHEN PT IS WITHIN THE REFERENCE RANGE.    Comment for INR at 0933 on 05/15/16: INR HAS NO DEFINED SIGNIFICANCE WHEN PT IS WITHIN THE REFERENCE RANGE.      UA Results       12/27/23     0035    Color Yellow    Clarity Clear    Glucose Negative    Bilirubin Negative    Ketones +1    Sp Grav 1.028    Blood Trace    Ph 6.0     Protein +1    Urobilinogen 2.0    Nitrite Negative    Leuk Est Negative    WBC 0 TO 3    RBC 0 TO 4    Bacteria None Seen         Comment for Ketones at 0035 on 12/27/23: Free sulfhydryl drugs such as Mesna, Capoten, and Acetylcysteine (Mucomyst) may cause false positive ketonuria.    Comment for Blood at 0035 on 12/27/23: The sensitivity of the occult blood test is equivalent to approximately 4 intact RBC/HPF.    Comment for Leuk Est at 0035 on 12/27/23: Results can be falsely negative due to high specific gravity, some antibiotics, glucose >3 g/dl, or WBC other than neutrophils.      Lactate Results       12/27/23 0032    Lactate 1.6          Microbiology Results     Procedure Component Value Units Date/Time    C. difficile by PCR Stool [[id number]] Collected: 12/27/23 1120    Specimen: Stool Updated: 12/27/23 1954     C diff PCR Negative     C diff PCR Comment --    Stool culture Stool [[id number]]  (Normal) Collected: 12/27/23 1120    Specimen: Stool Updated: 12/28/23 0837     Stool Culture Culture in progress    Fecal leukocytes Stool [[id number]]  (Normal) Collected: 12/27/23 1120    Specimen: Stool Updated: 12/27/23 2042     Fecal Leukocytes No WBC Seen    Ova and parasite screen Stool [[id number]]  (Normal) Collected: 12/27/23 1120    Specimen: Stool Updated: 12/28/23 1047     Ova + Parasite Exam No ova and parasites seen    SARS-CoV-2 (COVID-19) Nasopharynx [[id number]]  (Normal) Collected: 12/27/23 0037    Specimen: Nasopharyngeal Swab from Nasopharynx Updated: 12/27/23 0132    Narrative:      The following orders were created for panel order SARS-CoV-2 (COVID-19) Nasopharynx.  Procedure                               Abnormality         Status                     ---------                               -----------         ------                     SARS-COV-2 (COVID-19)/ F...[[id number]]  Normal              Final result                 Please view results for these tests on the individual orders.     SARS-COV-2 (COVID-19)/ FLU A/B, AND RSV, PCR Nasopharynx [[id number]]  (Normal) Collected: 12/27/23 0037    Specimen: Nasopharyngeal Swab from Nasopharynx Updated: 12/27/23 0132     SARS-CoV-2 (COVID-19) Negative     Influenza A Negative     Influenza B Negative     Respiratory Syncytial Virus Negative    Narrative:      Testing performed using real-time PCR for detection of COVID-19. EUA approved validation studies performed on site.     Blood Culture Blood, Venous [[id number]]  (Normal) Collected: 12/27/23 0032    Specimen: Blood, Venous Updated: 12/28/23 0900     Culture No growth at 18-24 hours    Parasites, peripheral blood Blood, Venous [[id number]] Collected: 12/27/23 0032    Specimen: Blood, Venous Updated: 12/27/23 1328     Parasites, Peripheral Blood Smear --     Pathologist Interpretation No parasites seen.    Gilmar Dumont DO      Electronically signed by Gilmar Dumont DO on December 27, 2023 at 1:28 PM.    Blood Culture Blood, Venous [[id number]]  (Normal) Collected: 12/26/23 1847    Specimen: Blood, Venous Updated: 12/28/23 0501     Culture No growth at 18-24 hours          Pathology Results     ** No results found for the last 720 hours. **          Echo:         Imaging:    Radiology Imaging    XR CHEST 1 VW    Narrative  CLINICAL HISTORY:  Fever    Impression  IMPRESSION:  Findings concerning for peribronchial vascular  infectious/inflammatory disease in the right. See comment    COMPARISON: Chest studies January 2013, May 2016.    COMMENT:  Upright AP portable imaging of the chest 0008 hours  Normal lung volumes. Peribronchial vascular opacity in the right through the mid  mid and lower lungs zones, concerning for inflammatory changes/pneumonia in the  appropriate setting.  No discernible pleural fluid.  Normal cardiac size and pulmonary vascularity.  Stable hilar and mediastinal contours.    Unremarkable imaged upper abdomen.  No acute osseous abnormality.      Assessment      1. Diarrhea -  likely due to #1 and now resolved with C diff PCR being negative in the setting of recent abx exposure.     2. Recent dx of COVID-19 - pt tested positive on 10/28 with no respiratory symptoms being reported now.      3. Fever - in the setting of #1,2 and now resolved with blood cx showing no growth to date     Plan      1. Monitor off antibiotics with infectious workup being negative and pt remaining asymptomatic now.

## 2023-12-28 NOTE — PLAN OF CARE
Care Coordination Admission Assessment Note    General Information:  Readmission Within the last 30 days: no previous admission in last 30 days  Does patient have a :    Patient-Specific Goals (include timeframe):      Living Arrangements:  Arrived From:    Current Living Arrangements: home  People in Home: spouse  Home Accessibility:    Living Arrangement Comments:      Housing Stability and Utility Access (SDOH):  In the last 12 months, was there a time when you were not able to pay the mortgage or rent on time?: No  In the last 12 months, how many places have you lived?:    In the last 12 months, was there a time when you did not have a steady place to sleep or slept in a shelter (including now)?: No  In the past 12 months has the electric, gas, oil, or water company threatened to shut off services in your home?: No    Functional Status Prior to Admission:   Assistive Device/Animal Currently Used at Home:    Functional Status Comments:    IADL Comments:       Supports and Services:  Current Outpatient/Agency/Support Group:    Type of Current Home Care Services:    History of home care episode or rehab stay:      Discharge Needs Assessment:   Concerns to be Addressed:    Current Discharge Risk:    Anticipated Changes Related to Illness:      Patient/Family Anticipated Discharge Plan:  Patient/Family Anticipates Transition To: home with family  Patient/Family Anticipated Services at Transition:      Connection to Community  Not applicable      Patient Choice:   Offered/Gave Vendor List: no  Patient's Choice of Community Agency(s):         Anticipated Discharge Plan:     Anticipated Discharge Disposition: home with assistance, home without assistance or services     Transportation Needs (SDOH):  Transportation Concerns:    Transportation Anticipated: family or friend will provide  Is Out of Hospital DNR needed at discharge?: no    In the past 12 months, has lack of transportation kept you from  medical appointments or from getting medications?: No  In the past 12 months, has lack of transportation kept you from meetings, work, or from getting things needed for daily living?: No    Concerns - comments: PT chart reviewed, pcp/pharmacy verified. No d/c needs at this time. SW will continue to follow for ongoing needs and appropriate aftercare.

## 2023-12-28 NOTE — NURSING NOTE
Discharge instructions reviewed with patient Pharmacy updated from [facility] mail order to [facility] in [place]. and DR Rouse aware. All questions answered. Patient escorted off unit in wheel chair with sister and PCT. Personal belongings taken home by family.

## 2023-12-28 NOTE — DISCHARGE SUMMARY
[facility] Medicine Service -  Inpatient Discharge Summary        BRIEF OVERVIEW   Patient: Virgil Edge (1955)    Admitting Provider: Cody Raphael MD  Attending Provider: Cody Mcgee DO Attending phys phone: [phone]    PCP: Krunal Alcala [phone]    Admission Date: 12/26/2023  Discharge Date: 12/28/2023     DISCHARGE DIAGNOSES      Primary Discharge Diagnosis  Fever, unknown origin    Secondary Discharge Diagnoses  Active Hospital Problems    Diagnosis Date Noted   • Fever, unknown origin 12/27/2023   • Diarrhea 12/27/2023   • Infiltrate of middle lobe of right lung present on imaging study 12/27/2023   • Neurogenic bladder 02/06/2015      Resolved Hospital Problems   No resolved problems to display.       Problem List on Day of Discharge  Infiltrate of middle lobe of right lung present on imaging study  Assessment & Plan  · S/p doxycycline, augmentin as outpt  · Given lack of respiratory symptoms, will monitor off antibiotics for now      Diarrhea  Assessment & Plan  > Ongoing diarrhea with fever, intermittent bloody output  > Possible IBD vs other  · GI consult  · Recommend outpatient follow-up for consideration for colonoscopy    Neurogenic bladder  Assessment & Plan  · Straight caths himself    * Fever, unknown origin  Assessment & Plan  > Unclear etiology, possible IBD vs intra-abdominal infection vs other  · Will monitor off antibiotics for now  · C. difficile negative, ova and parasite negative.  No white blood cell in stool.  Blood cultures negative x 18 to 24 hours.  Fever resolving.        SUMMARY OF HOSPITALIZATION      Presenting Problem/History of Present Illness  This is a 68 y.o. year-old male admitted on 12/26/2023 with Fever, unknown origin.     Hospital Course    Mr. Edge is a 68-year-old male with PMHx neurogenic bladder requiring straight cath, BPH, CAD, MS, HTN, HLD.  Presented with ongoing fever of unknown origin.  He has had diarrhea for months since he had  COVID and was given Paxlovid.  Has been on multiple rounds of antibiotics since then as well.  He was evaluated for C. difficile, found to be negative.  No WBCs on stool culture, no ova and parasites seen.  Blood cultures negative thus far.  Continued to have low-grade fever in the hospital, resolved on day of discharge.  He was evaluated by GI, recommended outpatient follow-up for consideration for colonoscopy given ongoing diarrhea, could consider evaluation for microscopic colitis.  Recommended cholestyramine and symptomatic management for diarrhea at this point.  Evaluated by infectious disease as well, felt no obvious bacterial infection, monitored off antibiotics, recommend to follow-up as an outpatient.    Exam on Day of Discharge  General: no acute distress, awake and alert  HEENT: extraocular movements intact  Cardio: regular rate and rhythm  Pulm: clear to auscultation bilaterally, no wheezing  Abdominal: soft, nontender, nondistended  MSK: grossly intact, ROM intact, no edema  Skin: normal color and turgor  Neuro: Awake and alert, oriented x3, no focal deficits    Consults During Admission  IP CONSULT TO GASTROENTEROLOGY  IP CONSULT TO INFECTIOUS DISEASE    DISCHARGE MEDICATIONS               Medication List      START taking these medications    cholestyramine 4 gram packet  Commonly known as: QUESTRAN  Start taking on: December 29, 2023  Take 1 packet by mouth daily.  Dose: 1 packet        CONTINUE taking these medications    aspirin 81 mg enteric coated tablet  Take 81 mg by mouth daily.  Dose: 81 mg     baclofen 10 mg tablet  Commonly known as: LIORESAL  Take 1 tablet (10 mg total) by mouth 2 (two) times a day.  Dose: 10 mg     cholecalciferol (vitamin D3) 1,000 unit (25 mcg) tablet  Take 2,000 Units by mouth daily.  Dose: 2,000 Units     clonazePAM 0.5 mg tablet  Commonly known as: klonoPIN  Take 1 tablet (0.5 mg total) by mouth daily.  Dose: 0.5 mg     cranberry extract 425 mg capsule  Take 425 mg  by mouth daily.  Dose: 425 mg     escitalopram 10 mg tablet  Commonly known as: LEXAPRO  Take 10 mg by mouth daily.  Dose: 10 mg     metoprolol tartrate 25 mg tablet  Commonly known as: LOPRESSOR  Take 12.5 mg by mouth 2 (two) times a day.  Dose: 12.5 mg     rosuvastatin 10 mg tablet  Commonly known as: CRESTOR  Take 10 mg by mouth 3 (three) times a week (Mon, Wed, Fri).  Dose: 10 mg                    PROCEDURES / LABS / IMAGING      Pertinent Labs  Lab Results   Component Value Date    WBC 5.84 12/28/2023    HGB 10.7 (L) 12/28/2023    HCT 33.7 (L) 12/28/2023    MCV 91.8 12/28/2023     12/28/2023     Lab Results   Component Value Date    GLUCOSE 84 12/28/2023    CALCIUM 8.1 (L) 12/28/2023     (L) 12/28/2023    K 4.2 12/28/2023    CO2 20 (L) 12/28/2023     12/28/2023    BUN 13 12/28/2023    CREATININE 0.6 (L) 12/28/2023           Pertinent Imaging  X-RAY CHEST 1 VIEW    Result Date: 12/27/2023  IMPRESSION:  Findings concerning for peribronchial vascular infectious/inflammatory disease in the right. See comment COMPARISON: Chest studies January 2013, May 2016. COMMENT:  Upright AP portable imaging of the chest 0008 hours Normal lung volumes. Peribronchial vascular opacity in the right through the mid mid and lower lungs zones, concerning for inflammatory changes/pneumonia in the appropriate setting. No discernible pleural fluid. Normal cardiac size and pulmonary vascularity. Stable hilar and mediastinal contours. Unremarkable imaged upper abdomen. No acute osseous abnormality.     CT ABDOMEN PELVIS WITH IV CONTRAST    Result Date: 12/27/2023  IMPRESSION: There are nondilated fluid-filled bowel loops compatible with history of constipation.  Otherwise no acute abdominal/pelvic pathology appreciated. Incompletely imaged right middle lobe infiltrate.       OUTPATIENT  FOLLOW-UP / REFERRALS / PENDING TESTS        Outpatient Follow-Up Appointments  Encounter Information    This patient does not  currently have any appointments scheduled.         Referrals  No orders of the defined types were placed in this encounter.      Test Results Pending at Discharge  Unresulted Labs (From admission, onward)     Start     Ordered    12/27/23 1813  IgG, IgA, IgM  Once        Question:  Release to patient  Answer:  Immediate    12/27/23 1812    12/27/23 1142  Calprotectin, Fecal Stool  Once        Question:  Release to patient  Answer:  Immediate    12/27/23 0936    12/26/23 2338  Anaplasma Phagocytophilum DNA, PCR  ([facility])  STAT        Question:  Release to patient  Answer:  Immediate    12/26/23 2337    12/26/23 1847  [facility] Draw Panel  STAT        Question Answer Comment   Red Top 1 Label    Gold Top 1 Label    Light Blue 1 Label    Lavender Top No Labels    Pink Top No Labels    Yellow - Urine Tall No Labels    Urine Culture Tube No Labels    Blood Culture 1 set        12/26/23 1846                Important Issues to Address in Follow-Up  Follow-up with PCP, GI    DISCHARGE DISPOSITION AND DESTINATION      Disposition: Home   Destination:                              Code Status At Discharge: Full Code    Physician Order for Life-Sustaining Treatment Document Status      No documents found

## 2023-12-28 NOTE — CONSULTS
Nutrition Status Classification: Mild nutritional compromise     Clinical Course: Patient is a 68 y.o. male who was admitted on 12/26/2023 with a diagnosis of Fever, unknown origin [R50.9]  Fever, unspecified fever cause [R50.9]  Pneumonia of right lung due to infectious organism, unspecified part of lung [J18.9].   Past Medical History:   Diagnosis Date   • BPH (benign prostatic hyperplasia)    • CAD (coronary artery disease)    • FHx: multiple sclerosis      Past Surgical History:   Procedure Laterality Date   • CORONARY ANGIOPLASTY WITH STENT PLACEMENT  05/2016    x 3   • TRANSURETHRAL RESECTION OF PROSTATE  2013       Nutrition Interventions/Recommendations:   1. Low fiber diet is recommended  2. Continue Orgain shakes, wife to bring from home  3. Continue with probiotic containing foods like yogurt, pt also takes probiotic supplement  4. Antidiarrheals prn            Dietary Orders   (From admission, onward)             Start     Ordered    12/27/23 0321  Adult Diet Regular; RD/LDN may adjust order  Diet effective now        References:    IDDSI Diet reference   Question Answer Comment   Diet Texture Regular    Delegation of Authority. Diet orders written by PA/CRGriffin may not be adjusted by RD/LDNs. RD/LDN may adjust order        12/27/23 0320                Reason for Assessment  Reason For Assessment: identified at risk by screening criteria, per organizational policy  Diagnosis: gastrointestinal disease    MST Nutrition Screen Tool  Has patient lost weight without trying?: 1-->Yes, 2-13 lbs (10 lbs)  Has patient been eating poorly due to decreased appetite?: 1-->Yes  MST Nutrition Screen Score: 2    Nutrition/Diet History  Typical Food/Fluid Intake: home with wife  Diet Prior to Admission: regular  Intake (%): 0%  Supplemental Drinks/Foods/Additives: Orgain  Factors Affecting Nutritional Intake: diarrhea    Physical Findings  Overall Physical Appearance:  (not seen)  Gastrointestinal: diarrhea  Last Bowel  "Movement: 12/27/23  Skin: intact    Last Bowel Movement: 12/27/23    Nutrition Order  Nutrition Order: meets nutritional requirements  Nutrition Order Comments: regular    Anthropometrics  Height: 177.8 cm (5' 10\")           Current Weight  Weight Method: Stated  Weight: 61.2 kg (135 lb)    Ideal Body Weight (IBW)  Ideal Body Weight (IBW) (kg): 76.48  % Ideal Body Weight: 80.07    Usual Body Weight (UBW)  Usual Body Weight: 66.7 kg (147 lb)  % of Usual Body Weight Assessment: 85-95% - mild deficit (6%)  Weight Loss: unintentional  Time Frame: 3 Months    Body Mass Index (BMI)  BMI (Calculated): 19.4  BMI Assessment: BMI less than 22: patient > 65 years old                        Labs/Procedures/Meds  Lab Results Reviewed: reviewed, pertinent      Results from last 7 days   Lab Units 12/28/23  0546 12/27/23 0613 12/26/23  1847   SODIUM mEQ/L 135* 139 139   POTASSIUM mEQ/L 4.2 3.4* 3.9   CHLORIDE mEQ/L 103 105 103   CO2 mEQ/L 20* 27 27   BUN mg/dL 13 15 17   CREATININE mg/dL 0.6* 0.5* 0.6*   GLUCOSE mg/dL 84 88 125*   CALCIUM mg/dL 8.1* 8.0* 8.9      Results from last 7 days   Lab Units 12/26/23  1847   ALK PHOS IU/L 48   BILIRUBIN TOTAL mg/dL 0.5   ALBUMIN g/dL 3.4*   ALT IU/L 11   AST IU/L 16          No results found for: \"HGBA1C\"  No results found for: \"UWXSDAOG92\"  Lab Results   Component Value Date    CALCIUM 8.1 (L) 12/28/2023    PHOS 1.7 (L) 12/28/2023     Results from last 7 days   Lab Units 12/28/23  0546 12/27/23  0613 12/26/23  1847   WBC K/uL 5.84 3.69* 5.75   HEMOGLOBIN g/dL 10.7* 10.3* 12.6*   HEMATOCRIT % 33.7* 31.9* 38.9*   PLATELETS K/uL 190 158 218               Results from last 7 days   Lab Units 12/28/23  0546 12/27/23  0613   MAGNESIUM mg/dL 1.7* 1.8          Diagnostic Tests/Procedures  Diagnostic Test/Procedure Reviewed: reviewed, pertinent    Medications  Pertinent Medications Reviewed: reviewed, pertinent  • aspirin  81 mg oral Daily   • baclofen  10 mg oral BID   • cholecalciferol (vitamin " D3)  2,000 Units oral Daily   • cholestyramine  1 packet oral Daily   • clonazePAM  0.5 mg oral Nightly   • enoxaparin  40 mg subcutaneous Daily (6p)   • escitalopram  10 mg oral Daily   • metoprolol tartrate  12.5 mg oral BID   • potassium, sodium phosphates  2 packet oral With meals & nightly   • rosuvastatin  10 mg oral Once per day on Monday Wednesday Friday                              Weights (last 7 days)     Date/Time Weight    12/26/23 1843 61.2 kg (135 lb)            Clinical Comments: Chart rev'd for mst score >/= 2. Admitted from home with fever, diarrhea. Pt has had diarrhea off and on since end October when he was treated for Covid with Paxlovid. Takes Imodium without much relief. C-diff neg, plans to recheck. PMH: CAD, MS, BPH, chronic indwelling Diana. Spoke with pt on phone, wife is nearby. Pt confirms diarrhea off and on for past 2 mos, may have lost weight as a result. Says he may have been eating less during that time.He tries to eat 3 meals/day, has banana, coffee, yogurt (Activia), coffee cake for breakfast, grilled cheese for lunch and regular dinner. Drinks Orgain shakes (wife brought from home, he wants to drink here). Takes probiotic, mentioned Align. Only had 1 BM in 2 days. Wts in EMR rev'd, pt has wt loss of 6% or 9# in past 3 mos which is significant. :Pt says he is d/c today, unsure of time. Will attach low fiber diet education to d/c instructions. BMI is 19, low for age group. Following.           Date: 12/28/23  Signature: DIMPLE Fair

## 2023-12-29 LAB
BACTERIA STL CULT: NORMAL
IGA SERPL-MCNC: 150 MG/DL (ref 84.5–499)
IGG SERPL-MCNC: 585 MG/DL (ref 537–1535)
IGM SERPL-MCNC: 38 MG/DL (ref 35–242)

## 2023-12-30 LAB — A PHAGOCYTOPH DNA BLD QL NAA+PROBE: NOT DETECTED

## 2023-12-31 LAB
BACTERIA BLD CULT: NORMAL
BACTERIA BLD CULT: NORMAL

## 2024-01-02 NOTE — ED ATTESTATION NOTE
Procedures  Physical Exam  Review of Systems    1/2/202410:46 AM  I have personally seen and examined the patient.  I reviewed and agree with the PA/NP/Resident's assessment and plan of care.    My examination, assessment, and plan of care of Virgil Edge is as follows:  The patient presents with fever for 1 week.  This is a 68-year-old gentleman who was recently treated with Paxlovid for COVID in November.  He has diarrhea and has been taking Imodium.  The patient also has a neurogenic bladder and self-caths.  The patient has been on Augmentin for a UTI.  He is also taking doxycycline for a recent chest x-ray revealing a left perihilar infiltrate.  Exam: The patient was alert in no acute distress.  HEENT was unremarkable.  His neck was supple without nuchal rigidity.  His heart was regular and lungs were clear. Examination of the buttock revealed a small erythematous ulceration adjacent to the anus.  Impression/Plan: Lab work was essentially unremarkable other than a decreased hemoglobin of 10.3.  The patient went for chest x-ray and CAT scan of the abdomen and pelvis.  There was a right middle lobe infiltrate.    The patient was admitted to the medical service and treated with IV fluids and IV antibiotics for pneumonia.    Vital Sign Review: Vital signs have been ordered and reviewed. The oxygen saturation is 94% on room air, mildly hypoxic.     I was physically present for the key/critical portions of the following procedures: None    This document was created using dragon dictation software.  There might be some typographical errors due to this technology.     Alvaro Segovia DO  01/02/24 [id number]

## 2024-03-07 ENCOUNTER — TELEPHONE (OUTPATIENT)
Dept: NEUROLOGY | Facility: CLINIC | Age: 69
End: 2024-03-07
Payer: MEDICARE

## 2024-03-07 DIAGNOSIS — G35 MULTIPLE SCLEROSIS (CMS/HCC): Primary | ICD-10-CM

## 2024-03-07 RX ORDER — OCRELIZUMAB 300 MG/10ML
600 INJECTION INTRAVENOUS
Qty: 20 ML | Refills: 1 | Status: SHIPPED | OUTPATIENT
Start: 2024-03-07 | End: 2024-03-22 | Stop reason: SDUPTHER

## 2024-03-07 NOTE — TELEPHONE ENCOUNTER
[facility] needs a new Ocrevus script. Please print prescription. I did not see Ocrevus on his refill list    Glycopyrrolate Counseling:  I discussed with the patient the risks of glycopyrrolate including but not limited to skin rash, drowsiness, dry mouth, difficulty urinating, and blurred vision.

## 2024-03-18 DIAGNOSIS — G35 MULTIPLE SCLEROSIS (CMS/HCC): ICD-10-CM

## 2024-03-18 RX ORDER — CLONAZEPAM 0.5 MG/1
0.5 TABLET ORAL DAILY
Qty: 90 TABLET | Refills: 1 | Status: SHIPPED | OUTPATIENT
Start: 2024-03-18 | End: 2024-03-21 | Stop reason: SDUPTHER

## 2024-03-21 DIAGNOSIS — G35 MULTIPLE SCLEROSIS (CMS/HCC): ICD-10-CM

## 2024-03-21 RX ORDER — CLONAZEPAM 0.5 MG/1
0.5 TABLET ORAL DAILY
Qty: 90 TABLET | Refills: 1 | Status: SHIPPED | OUTPATIENT
Start: 2024-03-21 | End: 2024-09-20

## 2024-03-21 NOTE — TELEPHONE ENCOUNTER
Checked pdmp 12/14/2023    *Patient is no longer using [facility]. He is now using [facility] in [place] on file

## 2024-03-22 DIAGNOSIS — G35 MULTIPLE SCLEROSIS (CMS/HCC): ICD-10-CM

## 2024-03-22 RX ORDER — OCRELIZUMAB 300 MG/10ML
600 INJECTION INTRAVENOUS
Qty: 20 ML | Refills: 1 | Status: SHIPPED | OUTPATIENT
Start: 2024-03-22

## 2024-09-11 NOTE — PROGRESS NOTES
Neurology Progress Note    Subjective     Virgil Edge is a 69 y.o. male evaluated regarding multiple sclerosis.  He returns with his wife Ashly who is a nurse.     I initially evaluated Bennie 9/9/2020.  He initially presented with binocular visual impairment 38 years ago.  He improved with oral steroids.  He went on to have other relapses.  He followed with neurologists Dr. Aubrey Bennett at [facility] and subsequently Dr. Alberto Guallpa locally. He took Copaxone for many years but once he had Medicare he reduced his compliance to about twice weekly.  He indicated his last relapse was about a year ago.  He said he may get a relapse every 1 to 2 years.  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  He denied difficulty with vision or hand function.  He drove with his right foot on the accelerator and left foot on the brake.  He  had a right AFO brace for about 15 years; he got a new one last year.  He had been ambulatory with a walker for about 10 years.  He had rare falls.  His wife is a nurse.  MRI brain 9/7/2018 which reported white matter lesions consistent with multiple sclerosis but no enhancement.  MRI cervical spine then showed a left posterior lateral cord high signal at C3-4 and a questionable area lateral at C5.  He had been on Social Security since 2010.  He lived in a townhouse with his wife and had a stair chair x8 months.     The patient returned 10/14/2020. MRI brain 10/13/2020 reported scattered foci of T2 prolongation in the periventricular and deep white matter also involving the corpus callosum.  There were no enhancing abnormalities.  MRI cervical spine showed foci of abnormal signal throughout the cord most notably at C2-3 on the left, C3-4 on the left, C5-6 bilaterally, C7 on the left; there was no pathologic enhancement.  Laboratory studies 10/5/2020 showed benign chemistries and CBC.  Hepatitis B studies were not obtained yet.  The wife felt that his ambulation had declined in  the last 1 year.  He had physical therapy for 2 months over the summer through [facility]. Alternative disease modifying therapies were discussed including oral therapies and Ocrevus.     The wife phoned 12/14/2020 indicating that the patient had enhanced weakness in the lower extremities with some pins-and-needles in the lower back beginning 2 days prior.  This had occurred in the past and the patient would go on to an oral prednisone taper; I sent in a prescription in this regard.     He was seen via telemedicine 12/17/2020.  He said  he bounced back pretty quickly after beginning prednisone 60 mg daily x4 days.    He said that 3 weeks ago he had a UTI that cleared with Cipro twice daily x10 days.  It was after the UTI cleared that he had difficulty with his legs.  He did have a repeat urine culture.  I reviewed his laboratories of 11/23/2020 showing negative hepatitis B studies.  He had 2 months left of his Copaxone and was interested in transitioning to Ocrevus.     He had his first Ocrevus infusions 3/2/2021 and 3/17/2021.  When seen 7/6/2021 he indicated that he tolerated these well.  He did have his Modernna COVID-19 vaccinations 2/8/2021 and 3/8/2021.  He drove to the office in his [employer] and drove with both feet.  He put his walker in the back seat.  He was given a wheelchair and escorted back to our office.  At home there are 5 steps into the front door of their townhouse and he has a railing.  He used a cane to get in the front door and in the house.  He admitted to some difficulty with memory.     I spoke to him by phone 9/7/2021. He fell at home and struck his face on a wooden stool with a laceration that got repaired at the [facility]. We discussed tetanus and COVID-19 booster vaccinations with reference to timing of Ocrevus.  He returned 11/17/2021 with his wife.  He  had a really large left black eye that went away in about 1 week.  He had stitches in the left eyebrow and below the  left eye.  His walking declined thereafter.  He was using a walker upstairs and downstairs but not a cane.  He was getting home physical therapy twice weekly.  He had not driven since his fall.  He always wore his right carbon fiber AFO.     When seen 11/15/2022 he was doing well.  He had his last Ocrevus infusion 9/7//2022.  Functionally he was at a plateau.  He denied any flares or falls or UTIs.  He used a scooter rarely.  He gave up driving and they sold the car. He had a walker upstairs and another walker downstairs at his home.  He had a chair lift.  He catheterized 4 times daily.        He was seen 7/11/2023 and reported left lower thoracic shingles about 4 months ago.  He recovered fully.  He says that about once per month he has choking and swallowing difficulties.  He is interested in seeing a speech therapist.  He can ambulate with a walker up to 25 yards.  He has not been involved with any recent physical therapies.    Although the patient was due to follow-up 10/2023 after updated MRI studies he now presents after 14 months.  MRI brain 11/15/2023 reported stable white matter lesions consistent with known multiple sclerosis as compared to prior study of 10/13/2020.  MRI cervical spine reported stable cervical spine cord lesions consistent with inactive demyelinating plaque; there was no abnormal enhancement; there was stability compared to 10/13/2020.  The patient had COVID 11/2023.  He received Paxlovid.  He thereafter had terrible diarrhea (multiple times per day).  He had a hospital admission 12/26 for 12/28/2023; listed diagnoses included FUO and diarrhea.  20 pounds.  He had follow-up with GI.  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  His last Ocrevus was 3/27/2024.      Medical History:   Past Medical History:   Diagnosis Date    BPH (benign prostatic hyperplasia)     CAD (coronary artery disease)     FHx:  multiple sclerosis        Surgical History:   Past Surgical History:   Procedure Laterality Date    CORONARY ANGIOPLASTY WITH STENT PLACEMENT  05/2016    x 3    TRANSURETHRAL RESECTION OF PROSTATE  2013       Allergies: Fluconazole    Current Outpatient Medications   Medication Sig Dispense Refill    aspirin 81 mg enteric coated tablet Take 81 mg by mouth daily.      baclofen (LIORESAL) 10 mg tablet Take 1 tablet (10 mg total) by mouth 2 (two) times a day. 180 tablet 3    budesonide EC (ENTOCORT EC) 3 mg 24 hr capsule Take 6 mg by mouth 3 (three) times a day.      cholecalciferol, vitamin D3, 1,000 unit (25 mcg) tablet Take 2,000 Units by mouth daily.      cholestyramine (QUESTRAN) 4 gram packet Take 1 packet by mouth daily. 30 packet 0    clonazePAM (klonoPIN) 0.5 mg tablet Take 1 tablet (0.5 mg total) by mouth daily. 90 tablet 1    cranberry extract 425 mg capsule Take 425 mg by mouth daily.      escitalopram (LEXAPRO) 10 mg tablet Take 10 mg by mouth daily.      OCREVUS 30 mg/mL solution Infuse 20 mL (600 mg total) into a venous catheter every 6 (six) months. 20 mL 1    rosuvastatin (CRESTOR) 10 mg tablet Take 10 mg by mouth 3 (three) times a week (Mon, Wed, Fri).       No current facility-administered medications for this visit.         Family History: No family history on file.    Social History:   Social History     Socioeconomic History    Marital status:      Spouse name: None    Number of children: 2    Years of education: None    Highest education level: None   Occupational History    Occupation: Retired   Tobacco Use    Smoking status: Never    Smokeless tobacco: Never   Substance and Sexual Activity    Alcohol use: Not Currently     Alcohol/week: 1.0 standard drink of alcohol     Types: 1 Glasses of wine per week    Drug use: Never     Social Determinants of Health     Food Insecurity: No Food Insecurity (12/26/2023)    Hunger Vital Sign     Worried About Running Out of Food in the Last Year:  Never true     Ran Out of Food in the Last Year: Never true   Transportation Needs: No Transportation Needs (12/28/2023)    PRAPARE - Transportation     Lack of Transportation (Medical): No     Lack of Transportation (Non-Medical): No   Housing Stability: Unknown (12/28/2023)    Housing Stability Vital Sign     Unable to Pay for Housing in the Last Year: No     Unstable Housing in the Last Year: No       Objective     Physical Exam  Visit Vitals  /80   Pulse 79   SpO2 98%       Higher cortical function: Cognitively clear, language intact.  Cranial nerves: VINCENT greater left gaze > right gaze.  Otherwise cranial nerves II through XII normal.  Motor: 3-/5 bilateral hip flexor weakness, worse on the right.   Mild slowing of rapid movements  hands, right greater than left.  Mild outflow tremor with finger-to-nose.  Cerebellum: No upper extremity dysmetria.  Gait: In a wheelchair.    General: Awake, alert, in no apparent distress.  HEENT: Normocephalic atraumatic.  Eyes: Orbits benign, extraocular motions full.  Neck : Supple, no carotid bruit.    Lungs: Clear bilaterally.  Heart: S1 and S2 normal, regular rate and rhythm, no murmur.  Extremities: Right AFO brace  Musculoskeletal: No injury or deformity.  Psychiatric: Appropriate and cooperative  Assessment and Plan    Multiple sclerosis (CMS/[facility])  The patient has a history of multiple sclerosis dating to 42 years ago.  He had been on Copaxone for many years and had reduced dosing to twice weekly when I met him 9/2020.  Patient had some enhanced weakness with some pins-and-needles in the lower extremities 12/2020 and received prednisone taper.  This occurred in relation to a UTI and so was likely a pseudo exacerbation.  He has been on Ocrevus since 3/2021.  He has remained clinically stable overall.  He declined after he had COVID 11/2023 with subsequent persisting diarrhea and weight loss.  His MRI brain and cervical spine studies 11/2023 showed stability from 2020.   We discussed the need for ongoing disease modifying therapy versus discontinuation.  At this time he prefers to continue Ocrevus.  I will reassess him in 6 months.      Arun Meadows MD  4:48 PM

## 2024-09-12 ENCOUNTER — OFFICE VISIT (OUTPATIENT)
Dept: NEUROLOGY | Facility: CLINIC | Age: 69
End: 2024-09-12
Payer: MEDICARE

## 2024-09-12 VITALS — HEART RATE: 79 BPM | OXYGEN SATURATION: 98 % | SYSTOLIC BLOOD PRESSURE: 120 MMHG | DIASTOLIC BLOOD PRESSURE: 80 MMHG

## 2024-09-12 DIAGNOSIS — G35 MULTIPLE SCLEROSIS (CMS/HCC): Primary | ICD-10-CM

## 2024-09-12 PROCEDURE — 99215 OFFICE O/P EST HI 40 MIN: CPT | Performed by: PSYCHIATRY & NEUROLOGY

## 2024-09-12 RX ORDER — BUDESONIDE 3 MG/1
6 CAPSULE, COATED PELLETS ORAL 3 TIMES DAILY
COMMUNITY

## 2024-09-12 NOTE — ASSESSMENT & PLAN NOTE
The patient has a history of multiple sclerosis dating to 42 years ago.  He had been on Copaxone for many years and had reduced dosing to twice weekly when I met him 9/2020.  Patient had some enhanced weakness with some pins-and-needles in the lower extremities 12/2020 and received prednisone taper.  This occurred in relation to a UTI and so was likely a pseudo exacerbation.  He has been on Ocrevus since 3/2021.  He has remained clinically stable overall.  He declined after he had COVID 11/2023 with subsequent persisting diarrhea and weight loss.  His MRI brain and cervical spine studies 11/2023 showed stability from 2020.  We discussed the need for ongoing disease modifying therapy versus discontinuation.  At this time he prefers to continue Ocrevus.  I will reassess him in 6 months.

## 2024-09-20 DIAGNOSIS — G35 MULTIPLE SCLEROSIS (CMS/HCC): ICD-10-CM

## 2024-09-20 RX ORDER — CLONAZEPAM 0.5 MG/1
0.5 TABLET ORAL DAILY
Qty: 90 TABLET | Refills: 1 | Status: SHIPPED | OUTPATIENT
Start: 2024-09-20 | End: 2025-03-19 | Stop reason: SDUPTHER

## 2024-09-20 NOTE — TELEPHONE ENCOUNTER
Neurology PDMP CONTROL Medication Refills     PDMP check, if applicable:yes   Controlled Medication Treatment Agreement last signed, if applicable:n/a      Last fill date:6/14/24  # of refills provided:1   Last office visit date:9/12/24   Next office visit date:3/18/25   When instructed to follow up:6 months     Any recent cx/no show appts:

## 2024-10-10 RX ORDER — BACLOFEN 10 MG/1
10 TABLET ORAL 2 TIMES DAILY
Qty: 180 TABLET | Refills: 3 | Status: SHIPPED | OUTPATIENT
Start: 2024-10-10 | End: 2025-10-05

## 2024-10-10 NOTE — TELEPHONE ENCOUNTER
Neurology NON CONTROL Medication Refills:      Last fill date:8/24/23   # of refills provided:3   Last office visit date:9/12/24   Next office visit date:3/18/25   When instructed to follow up: 6 months   Any recent cx/no show appts:

## 2025-03-18 ENCOUNTER — OFFICE VISIT (OUTPATIENT)
Dept: NEUROLOGY | Facility: CLINIC | Age: 70
End: 2025-03-18
Payer: MEDICARE

## 2025-03-18 VITALS
SYSTOLIC BLOOD PRESSURE: 130 MMHG | HEIGHT: 70 IN | WEIGHT: 153 LBS | DIASTOLIC BLOOD PRESSURE: 80 MMHG | OXYGEN SATURATION: 98 % | HEART RATE: 68 BPM | BODY MASS INDEX: 21.9 KG/M2

## 2025-03-18 DIAGNOSIS — G35 MULTIPLE SCLEROSIS (CMS/HCC): Primary | ICD-10-CM

## 2025-03-18 PROCEDURE — 99215 OFFICE O/P EST HI 40 MIN: CPT | Performed by: PSYCHIATRY & NEUROLOGY

## 2025-03-18 NOTE — PROGRESS NOTES
Neurology Progress Note    Subjective     Virgil Edge is a 69 y.o. male evaluated regarding multiple sclerosis.  He returns with his wife Ashly who is a nurse.     I initially evaluated Bennie 9/9/2020.  He initially presented with binocular visual impairment 38 years ago.  He improved with oral steroids.  He went on to have other relapses.  He followed with neurologists Dr. Aubrey Bennett at [facility] and subsequently Dr. Alberto Guallpa locally. He took Copaxone for many years but once he had Medicare he reduced his compliance to about twice weekly.  He indicated his last relapse was about a year ago.  He said he may get a relapse every 1 to 2 years.  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  He denied difficulty with vision or hand function.  He drove with his right foot on the accelerator and left foot on the brake.  He  had a right AFO brace for about 15 years; he got a new one last year.  He had been ambulatory with a walker for about 10 years.  He had rare falls.  His wife is a nurse.  MRI brain 9/7/2018 which reported white matter lesions consistent with multiple sclerosis but no enhancement.  MRI cervical spine then showed a left posterior lateral cord high signal at C3-4 and a questionable area lateral at C5.  He had been on Social Security since 2010.  He lived in a townhouse with his wife and had a stair chair x8 months.     The patient returned 10/14/2020. MRI brain 10/13/2020 reported scattered foci of T2 prolongation in the periventricular and deep white matter also involving the corpus callosum.  There were no enhancing abnormalities.  MRI cervical spine showed foci of abnormal signal throughout the cord most notably at C2-3 on the left, C3-4 on the left, C5-6 bilaterally, C7 on the left; there was no pathologic enhancement.  Laboratory studies 10/5/2020 showed benign chemistries and CBC.  Hepatitis B studies were not obtained yet.  The wife felt that his ambulation had declined in  the last 1 year.  He had physical therapy for 2 months over the summer through [facility]. Alternative disease modifying therapies were discussed including oral therapies and Ocrevus.     The wife phoned 12/14/2020 indicating that the patient had enhanced weakness in the lower extremities with some pins-and-needles in the lower back beginning 2 days prior.  This had occurred in the past and the patient would go on to an oral prednisone taper; I sent in a prescription in this regard.     He was seen via telemedicine 12/17/2020.  He said  he bounced back pretty quickly after beginning prednisone 60 mg daily x4 days.    He said that 3 weeks ago he had a UTI that cleared with Cipro twice daily x10 days.  It was after the UTI cleared that he had difficulty with his legs.  He did have a repeat urine culture.  I reviewed his laboratories of 11/23/2020 showing negative hepatitis B studies.  He had 2 months left of his Copaxone and was interested in transitioning to Ocrevus.     He had his first Ocrevus infusions 3/2/2021 and 3/17/2021.  When seen 7/6/2021 he indicated that he tolerated these well.  He did have his Modernna COVID-19 vaccinations 2/8/2021 and 3/8/2021.  He drove to the office in his [employer] and drove with both feet.  He put his walker in the back seat.  He was given a wheelchair and escorted back to our office.  At home there are 5 steps into the front door of their townhouse and he has a railing.  He used a cane to get in the front door and in the house.  He admitted to some difficulty with memory.     I spoke to him by phone 9/7/2021. He fell at home and struck his face on a wooden stool with a laceration that got repaired at the [facility]. We discussed tetanus and COVID-19 booster vaccinations with reference to timing of Ocrevus.  He returned 11/17/2021 with his wife.  He  had a really large left black eye that went away in about 1 week.  He had stitches in the left eyebrow and below the  "left eye.  His walking declined thereafter.  He was using a walker upstairs and downstairs but not a cane.  He was getting home physical therapy twice weekly.  He had not driven since his fall.  He always wore his right carbon fiber AFO.     When seen 11/15/2022 he was doing well.  He had his last Ocrevus infusion 9/7//2022.  Functionally he was at a plateau.  He denied any flares or falls or UTIs.  He used a scooter rarely.  He gave up driving and they sold the car. He had a walker upstairs and another walker downstairs at his home.  He had a chair lift.  He catheterized 4 times daily.        He was seen 7/11/2023 and reported left lower thoracic shingles about 4 months ago.  He recovered fully.  He says that about once per month he has choking and swallowing difficulties.  He is interested in seeing a speech therapist.  He can ambulate with a walker up to 25 yards.  He has not been involved with any recent physical therapies.     Although the patient was due to follow-up 10/2023 after updated MRI studies but presented 9/12/2024 after 14 months.  MRI brain 11/15/2023 reported stable white matter lesions consistent with known multiple sclerosis as compared to prior study of 10/13/2020.  MRI cervical spine reported stable cervical spine cord lesions consistent with inactive demyelinating plaque; there was no abnormal enhancement; there was stability compared to 10/13/2020.  The patient had COVID 11/2023.  He received Paxlovid.  He thereafter had terrible diarrhea (multiple times per day).  He had a hospital admission 12/26 for 12/28/2023; listed diagnoses included FUO and diarrhea.  He lost 20 pounds.  He had follow-up with GI.  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     At this time the patient says that he has had diarrhea for 1 year due to inflammatory colitis.  He may sit on the toilet for half an hour and \"have little " "poops\", then get up and realize he has not done his he has diminished sensation in the perirectal area.  There has not been any change in his neurologic function.  He had laboratories 3/12/2025 that showed benign CBC and chemistries, nonfasting glucose 183 with A1c 5.4, .     Medical History:   Past Medical History:   Diagnosis Date    BPH (benign prostatic hyperplasia)     CAD (coronary artery disease)     FHx: multiple sclerosis        Surgical History:   Past Surgical History   Procedure Laterality Date    Coronary angioplasty with stent placement  05/2016    x 3    Transurethral resection of prostate  2013       Allergies: Fluconazole    Current Outpatient Medications   Medication Sig Dispense Refill    aspirin 81 mg enteric coated tablet Take 81 mg by mouth daily.      baclofen (LIORESAL) 10 mg tablet Take 1 tablet (10 mg total) by mouth 2 (two) times a day. 180 tablet 3    budesonide EC (ENTOCORT EC) 3 mg 24 hr capsule Take 6 mg by mouth 3 (three) times a day.      cholecalciferol, vitamin D3, 1,000 unit (25 mcg) tablet Take 2,000 Units by mouth daily.      clonazePAM (klonoPIN) 0.5 mg tablet TAKE ONE TABLET BY MOUTH EVERY DAY 90 tablet 1    cranberry extract 425 mg capsule Take 425 mg by mouth daily.      escitalopram (LEXAPRO) 10 mg tablet Take 10 mg by mouth daily.      OCREVUS 30 mg/mL solution Infuse 20 mL (600 mg total) into a venous catheter every 6 (six) months. 20 mL 1    rosuvastatin (CRESTOR) 10 mg tablet Take 10 mg by mouth 3 (three) times a week (Mon, Wed, Fri).      cholestyramine (QUESTRAN) 4 gram packet Take 1 packet by mouth daily. 30 packet 0     No current facility-administered medications for this visit.         Family History: No family history on file.    Social History:   Social History     Socioeconomic History    Marital status:      Spouse name: None    Number of children: 2    Years of education: None    Highest education level: None   Occupational History    " "Occupation: Retired   Tobacco Use    Smoking status: Never    Smokeless tobacco: Never   Substance and Sexual Activity    Alcohol use: Not Currently     Alcohol/week: 1.0 standard drink of alcohol     Types: 1 Glasses of wine per week    Drug use: Never     Social Drivers of Health     Food Insecurity: No Food Insecurity (12/26/2023)    Hunger Vital Sign     Worried About Running Out of Food in the Last Year: Never true     Ran Out of Food in the Last Year: Never true   Transportation Needs: No Transportation Needs (12/28/2023)    PRAPARE - Transportation     Lack of Transportation (Medical): No     Lack of Transportation (Non-Medical): No   Housing Stability: Unknown (12/28/2023)    Housing Stability Vital Sign     Unable to Pay for Housing in the Last Year: No     Unstable Housing in the Last Year: No         Objective     Physical Exam  Visit Vitals  /80   Pulse 68   Ht 1.778 m (5' 10\")   Wt 69.4 kg (153 lb)   SpO2 98%   BMI 21.95 kg/m²     Higher cortical function: Cognitively clear, language intact.  Cranial nerves: VINCENT greater left gaze > right gaze.  Otherwise cranial nerves II through XII normal.  Motor: 3-/5 bilateral hip flexor weakness, worse on the right.   Mild slowing of rapid movements  hands, right greater than left.  Mild outflow tremor with finger-to-nose.  Cerebellum: No upper extremity dysmetria.  Gait: Ambulates with a rolling walker, drags the right foot, right MAFO brace.  Has a wheelchair for longer distances.  General: Awake, alert, in no apparent distress.  HEENT: Normocephalic atraumatic.  Eyes: Orbits benign, extraocular motions full.  Neck : Supple, no carotid bruit.    Lungs: Clear bilaterally.  Heart: S1 and S2 normal, regular rate and rhythm, no murmur.  Extremities: Right carbon fiber AFO brace  Musculoskeletal: No injury or deformity.  Psychiatric: Appropriate and cooperative    Assessment and Plan    Multiple sclerosis (CMS/[facility])  The patient has been stable with respect to " his multiple sclerosis.  He is most troubled by inflammatory colitis.  There is some association with Ocrevus and inflammatory bowel disease.  Considering the patient's stability and age, it would be reasonable for him to discontinue Ocrevus.  For completeness he will have MRI brain and cervical spine studies with and without contrast.  I will confer with him subsequently.      Arun Meadows MD  12:23 PM

## 2025-03-18 NOTE — ASSESSMENT & PLAN NOTE
The patient has been stable with respect to his multiple sclerosis.  He is most troubled by inflammatory colitis.  There is some association with Ocrevus and inflammatory bowel disease.  Considering the patient's stability and age, it would be reasonable for him to discontinue Ocrevus.  For completeness he will have MRI brain and cervical spine studies with and without contrast.  I will confer with him subsequently.

## 2025-03-19 DIAGNOSIS — G35 MULTIPLE SCLEROSIS (CMS/HCC): ICD-10-CM

## 2025-03-19 RX ORDER — CLONAZEPAM 0.5 MG/1
0.5 TABLET ORAL DAILY
Qty: 90 TABLET | Refills: 1 | Status: SHIPPED | OUTPATIENT
Start: 2025-03-19

## 2025-03-19 NOTE — TELEPHONE ENCOUNTER
Neurology PDMP CONTROL Medication Refills     PDMP check, if applicable:yes   Controlled Medication Treatment Agreement last signed, if applicable: n/a     Last fill date:12/19/24  # of refills provided:1   Last office visit date:3/18/25   Next office visit date:-   When instructed to follow up:6 months     Any recent cx/no show appts:

## 2025-03-25 ENCOUNTER — RESULTS FOLLOW-UP (OUTPATIENT)
Dept: NEUROLOGY | Facility: CLINIC | Age: 70
End: 2025-03-25

## 2025-03-25 ENCOUNTER — HOSPITAL ENCOUNTER (OUTPATIENT)
Dept: RADIOLOGY | Age: 70
Discharge: HOME | End: 2025-03-25
Attending: PSYCHIATRY & NEUROLOGY
Payer: MEDICARE

## 2025-03-25 DIAGNOSIS — G35 MULTIPLE SCLEROSIS (CMS/HCC): ICD-10-CM

## 2025-03-25 RX ORDER — GADOBUTROL 604.72 MG/ML
6.8 INJECTION INTRAVENOUS ONCE
Status: COMPLETED | OUTPATIENT
Start: 2025-03-25 | End: 2025-03-25

## 2025-03-25 RX ADMIN — GADOBUTROL 6.8 ML: 604.72 INJECTION INTRAVENOUS at 10:44

## 2025-03-25 NOTE — PROGRESS NOTES
"Virgil MANCERA Minesh   [id number]    Your doctor has referred you for a MRI CERVICAL SPINE W WO CONTRAST that requires the injection of a contrast material into your bloodstream. This contrast material, sometimes referred to as \"x-ray dye\" allows for better interpretation and results in a more accurate interpretation of the examination.     Without the use of contrast, the examination may be less informative and result in a suboptimal examination, and possibly a delay in diagnosis and, if needed, treatment.     The contrast material is given through a small needle or catheter placed into a vein, usually on the inside of the elbow, on the back of hand, or in a vein in the foot or lower leg.    The most common, though still rare, potential reaction to an intravenous contrast injection is an allergic-like reaction. Most allergic-like reactions are mild, though a small subset of people can have more severe reactions. Mild reactions include mild / scattered hives, itching, scratchy throat, sneezing and nasal congestion. More severe reactions include facial swelling, severe difficulty breathing, wheezing and anaphylactic shock. Those with a prior history allergic-like reaction to the same class of contrast media (such as CT contrast or MRI contrast) are at the highest risk for an allergic reaction.     If you believe you had an allergic reaction to contrast in the past, please let our staff know. We can determine if this increases your risk for a future reaction and provide steps to decrease the risk. This may delay your examination, but it decreases the risk of having a new and possibly more severe reaction to the contrast injection.    People with a history of prior allergic reactions to other substances (such as unrelated medications and food) and patients with a history of asthma have slightly increased risk for an allergic reaction from contrast material when compared with the general population, but do not require to " be pretreated prior to a contrast injection.    You should notify the physician, nurse or technologist if you have ever had any of these conditions or if you have any questions.

## 2025-03-25 NOTE — PROGRESS NOTES
"Virgil MANCERA Minesh   [id number]    Your doctor has referred you for a MRI BRAIN W WO CONTRAST that requires the injection of a contrast material into your bloodstream. This contrast material, sometimes referred to as \"x-ray dye\" allows for better interpretation and results in a more accurate interpretation of the examination.     Without the use of contrast, the examination may be less informative and result in a suboptimal examination, and possibly a delay in diagnosis and, if needed, treatment.     The contrast material is given through a small needle or catheter placed into a vein, usually on the inside of the elbow, on the back of hand, or in a vein in the foot or lower leg.    The most common, though still rare, potential reaction to an intravenous contrast injection is an allergic-like reaction. Most allergic-like reactions are mild, though a small subset of people can have more severe reactions. Mild reactions include mild / scattered hives, itching, scratchy throat, sneezing and nasal congestion. More severe reactions include facial swelling, severe difficulty breathing, wheezing and anaphylactic shock. Those with a prior history allergic-like reaction to the same class of contrast media (such as CT contrast or MRI contrast) are at the highest risk for an allergic reaction.     If you believe you had an allergic reaction to contrast in the past, please let our staff know. We can determine if this increases your risk for a future reaction and provide steps to decrease the risk. This may delay your examination, but it decreases the risk of having a new and possibly more severe reaction to the contrast injection.    People with a history of prior allergic reactions to other substances (such as unrelated medications and food) and patients with a history of asthma have slightly increased risk for an allergic reaction from contrast material when compared with the general population, but do not require to be " pretreated prior to a contrast injection.    You should notify the physician, nurse or technologist if you have ever had any of these conditions or if you have any questions.

## 2025-04-07 ENCOUNTER — TELEPHONE (OUTPATIENT)
Dept: NEUROLOGY | Facility: CLINIC | Age: 70
End: 2025-04-07
Payer: MEDICARE

## 2025-04-07 DIAGNOSIS — G35 MULTIPLE SCLEROSIS (CMS/HCC): ICD-10-CM

## 2025-04-07 RX ORDER — OCRELIZUMAB 300 MG/10ML
600 INJECTION INTRAVENOUS
Qty: 20 ML | Refills: 1 | Status: SHIPPED | OUTPATIENT
Start: 2025-04-07

## 2025-04-07 NOTE — TELEPHONE ENCOUNTER
[facility] called requesting a new order from Dr Meadows for pt's Ocrevus. His current order  on 3/25/25.    [facility] Fax:  [phone]

## 2025-04-07 NOTE — TELEPHONE ENCOUNTER
Neurology NON CONTROL Medication Refills:      Last fill date:03/22/24   # of refills provided:1   Last office visit date:03/18/25   Next office visit date:No follow up on file    When instructed to follow up:   Return in about 6 months (around 9/18/2025).   Any recent cx/no show appts: